# Patient Record
Sex: MALE | Race: BLACK OR AFRICAN AMERICAN | NOT HISPANIC OR LATINO | Employment: OTHER | URBAN - METROPOLITAN AREA
[De-identification: names, ages, dates, MRNs, and addresses within clinical notes are randomized per-mention and may not be internally consistent; named-entity substitution may affect disease eponyms.]

---

## 2021-11-23 ENCOUNTER — HOSPITAL ENCOUNTER (EMERGENCY)
Facility: HOSPITAL | Age: 76
Discharge: HOME | End: 2021-11-23
Attending: EMERGENCY MEDICINE
Payer: MEDICARE

## 2021-11-23 VITALS
WEIGHT: 199 LBS | DIASTOLIC BLOOD PRESSURE: 83 MMHG | OXYGEN SATURATION: 100 % | HEART RATE: 83 BPM | BODY MASS INDEX: 27.86 KG/M2 | HEIGHT: 71 IN | SYSTOLIC BLOOD PRESSURE: 162 MMHG | TEMPERATURE: 98 F | RESPIRATION RATE: 17 BRPM

## 2021-11-23 DIAGNOSIS — R05.9 COUGH: ICD-10-CM

## 2021-11-23 DIAGNOSIS — J32.9 VIRAL SINUSITIS: Primary | ICD-10-CM

## 2021-11-23 DIAGNOSIS — R09.82 POSTNASAL DRIP: ICD-10-CM

## 2021-11-23 DIAGNOSIS — B97.89 VIRAL SINUSITIS: Primary | ICD-10-CM

## 2021-11-23 PROCEDURE — 99284 EMERGENCY DEPT VISIT MOD MDM: CPT

## 2021-11-23 RX ORDER — DIPHENHYDRAMINE HCL 25 MG/1
1 TABLET, FILM COATED ORAL
Qty: 126 ML | Refills: 0 | Status: SHIPPED | OUTPATIENT
Start: 2021-11-23 | End: 2021-11-30

## 2021-11-23 RX ORDER — BENZONATATE 100 MG/1
100 CAPSULE ORAL
Status: DISCONTINUED | OUTPATIENT
Start: 2021-11-23 | End: 2021-11-23

## 2021-11-23 RX ORDER — CETIRIZINE HYDROCHLORIDE 10 MG/1
10 TABLET ORAL NIGHTLY
Qty: 30 TABLET | Refills: 0 | Status: SHIPPED | OUTPATIENT
Start: 2021-11-23 | End: 2023-10-09

## 2022-10-10 ENCOUNTER — HOSPITAL ENCOUNTER (EMERGENCY)
Facility: HOSPITAL | Age: 77
Discharge: HOME | End: 2022-10-10
Attending: EMERGENCY MEDICINE
Payer: MEDICARE

## 2022-10-10 VITALS
HEIGHT: 71 IN | SYSTOLIC BLOOD PRESSURE: 138 MMHG | RESPIRATION RATE: 17 BRPM | HEART RATE: 92 BPM | TEMPERATURE: 98 F | WEIGHT: 189 LBS | OXYGEN SATURATION: 98 % | BODY MASS INDEX: 26.46 KG/M2 | DIASTOLIC BLOOD PRESSURE: 68 MMHG

## 2022-10-10 DIAGNOSIS — R35.0 URINARY FREQUENCY: Primary | ICD-10-CM

## 2022-10-10 LAB
BILIRUB UR QL STRIP: NEGATIVE
CLARITY UR: CLEAR
COLOR UR: YELLOW
GLUCOSE UR QL STRIP: NEGATIVE
HGB UR QL STRIP: NEGATIVE
KETONES UR QL STRIP: NEGATIVE
LEUKOCYTE ESTERASE UR QL STRIP: ABNORMAL
MICROSCOPIC COMMENT: NORMAL
NITRITE UR QL STRIP: NEGATIVE
PH UR STRIP: 6 [PH] (ref 5–8)
POCT GLUCOSE: 150 MG/DL (ref 70–110)
PROT UR QL STRIP: NEGATIVE
RBC #/AREA URNS HPF: 2 /HPF (ref 0–4)
SP GR UR STRIP: 1.02 (ref 1–1.03)
SQUAMOUS #/AREA URNS HPF: 1 /HPF
URN SPEC COLLECT METH UR: ABNORMAL
UROBILINOGEN UR STRIP-ACNC: NEGATIVE EU/DL
WBC #/AREA URNS HPF: 2 /HPF (ref 0–5)

## 2022-10-10 PROCEDURE — 99282 EMERGENCY DEPT VISIT SF MDM: CPT | Mod: 25

## 2022-10-10 PROCEDURE — 82962 GLUCOSE BLOOD TEST: CPT

## 2022-10-10 PROCEDURE — 81000 URINALYSIS NONAUTO W/SCOPE: CPT

## 2022-10-10 NOTE — ED TRIAGE NOTES
Pt. Reports he has been having frequency in urination  for the past few months. Pt. Reports he has discuss his problem with his PCP however he cont to have the same symptoms. Pt. Denies taking any meds. Pt. Denies any pain with urination.

## 2022-10-10 NOTE — ED PROVIDER NOTES
Encounter Date: 10/10/2022    SCRIBE #1 NOTE: I, Winnie Randall, am scribing for, and in the presence of,  Joo Hensley PA-C. I have scribed the following portions of the note - Other sections scribed: HPI, ROS.     History     Chief Complaint   Patient presents with    Urinary Frequency     Pt c/o urinary frequency x 1 week. Pt states he has had intermittent urinary frequency over the years but this time his symptoms have increased. Pt denies burning or blood in urine, cp, sob, n/v/d.     CC: Urinary Frequency    HPI: This is a 77 y.o.male patient, with no pertinent  PMHx, presenting to the ED for further evaluation of chronic intermittent urinary frequency worsening in the past week. Patient reports associated malodorous urine. Patient denies seeing an urologist regarding his symptoms in the past.  Patient reports symptom onset for the past 10 years.  Patient denies taking any medications on a daily basis except sinus medications. Patient states he eats a lot of greens and drinks many fluids including cranberry juice, lemonade, water and milk. Patient denies any fever, chills, shortness of breath, chest pain, neck pain, back pain, abdominal pain, rash, headaches, congestion, rhinorrhea, cough, sore throat, nausea, vomiting, diarrhea, dysuria, hematuria, or any other associated symptoms. No prior Tx. No alleviating or aggravating factors. No known drug allergies.        The history is provided by the patient. No  was used.   Review of patient's allergies indicates:  No Known Allergies  History reviewed. No pertinent past medical history.  Past Surgical History:   Procedure Laterality Date    LEG SURGERY      knee repair     Family History   Problem Relation Age of Onset    No Known Problems Mother     No Known Problems Father     No Known Problems Sister     No Known Problems Brother     No Known Problems Maternal Aunt     No Known Problems Maternal Uncle     No Known Problems Paternal Aunt      No Known Problems Paternal Uncle     No Known Problems Maternal Grandmother     No Known Problems Maternal Grandfather     No Known Problems Paternal Grandmother     No Known Problems Paternal Grandfather     Amblyopia Neg Hx     Blindness Neg Hx     Cancer Neg Hx     Cataracts Neg Hx     Diabetes Neg Hx     Glaucoma Neg Hx     Hypertension Neg Hx     Macular degeneration Neg Hx     Retinal detachment Neg Hx     Strabismus Neg Hx     Stroke Neg Hx     Thyroid disease Neg Hx      Social History     Tobacco Use    Smoking status: Former    Smokeless tobacco: Never   Substance Use Topics    Alcohol use: Yes     Comment: occasional    Drug use: No     Review of Systems   Constitutional:  Negative for chills and fever.   HENT:  Negative for congestion, ear pain, rhinorrhea and sore throat.    Eyes:  Negative for redness.   Respiratory:  Negative for cough and shortness of breath.    Cardiovascular:  Negative for chest pain.   Gastrointestinal:  Negative for abdominal pain, diarrhea, nausea and vomiting.   Genitourinary:  Positive for frequency. Negative for decreased urine volume, difficulty urinating, dysuria, hematuria and urgency.        (+) malodorous urine   Musculoskeletal:  Negative for back pain and neck pain.   Skin:  Negative for rash.   Neurological:  Negative for headaches.   Psychiatric/Behavioral:  Negative for confusion.      Physical Exam     Initial Vitals [10/10/22 1052]   BP Pulse Resp Temp SpO2   137/64 95 17 97.9 °F (36.6 °C) 97 %      MAP       --         Physical Exam    Nursing note and vitals reviewed.  Constitutional: Vital signs are normal. He appears well-developed and well-nourished. He is active.  Non-toxic appearance. No distress.   HENT:   Head: Normocephalic and atraumatic.   Mouth/Throat: Mucous membranes are normal.   Eyes: EOM are normal.   Neck: Trachea normal. Neck supple.   Normal range of motion.   Full passive range of motion without pain.     Cardiovascular:  Normal rate and  regular rhythm.           Pulses:       Radial pulses are 2+ on the right side and 2+ on the left side.   Pulmonary/Chest: Breath sounds normal. No respiratory distress.   Abdominal: Abdomen is soft. Bowel sounds are normal. He exhibits no distension. There is no abdominal tenderness.   No right CVA tenderness.  No left CVA tenderness. There is no rebound and no guarding.   Musculoskeletal:         General: No edema. Normal range of motion.      Cervical back: Full passive range of motion without pain, normal range of motion and neck supple. No rigidity.     Neurological: He is alert.   Skin: Skin is warm, dry and intact.   Psychiatric: He has a normal mood and affect.       ED Course   Procedures  Labs Reviewed   URINALYSIS, REFLEX TO URINE CULTURE - Abnormal; Notable for the following components:       Result Value    Leukocytes, UA 2+ (*)     All other components within normal limits    Narrative:     Specimen Source->Urine   POCT GLUCOSE - Abnormal; Notable for the following components:    POCT Glucose 150 (*)     All other components within normal limits   URINALYSIS MICROSCOPIC    Narrative:     Specimen Source->Urine   POCT GLUCOSE MONITORING CONTINUOUS          Imaging Results    None          Medications - No data to display  Medical Decision Making:   History:   Old Medical Records: I decided to obtain old medical records.  Clinical Tests:   Lab Tests: Ordered and Reviewed  ED Management:  This is a 77 y.o.male patient, with no pertinent  PMHx, presenting to the ED for further evaluation of chronic intermittent urinary frequency worsening in the past week. On physical exam, patient is well-appearing and in no acute distress.  Nontoxic appearing.  Lungs are clear to auscultation bilaterally.  Abdomen is soft and nontender.  No guarding, rigidity, rebound.  2+ radial pulses bilaterally.  No CVA tenderness bilaterally.  UA unremarkable.  Doubt cystitis.  Point of care glucose 150.  Ambulatory referral to  Urology ordered.  Urged prompt follow-up with urology and PCP for further evaluation.    Strict return precautions given. I discussed with the patient/family the diagnosis, treatment plan, indications for return to the emergency department, and for expected follow-up. The patient/family verbalized an understanding. The patient/family is asked if there are any questions or concerns. We discuss the case, until all issues are addressed to the patient/family's satisfaction. Patient/family understands and is agreeable to the plan. Patient is stable and ready for discharge.            Scribe Attestation:   Scribe #1: I performed the above scribed service and the documentation accurately describes the services I performed. I attest to the accuracy of the note.                   Clinical Impression:   Final diagnoses:  [R35.0] Urinary frequency (Primary)      ED Disposition Condition    Discharge Stable          ED Prescriptions    None       Follow-up Information       Follow up With Specialties Details Why Contact Info    [facility]  Schedule an appointment as soon as possible for a visit in 2 days for further evaluation [address] [place] [address]  [phone]      [facility] Emergency Dept Emergency Medicine In 2 days If symptoms worsen [address] [facility] [id number] [phone]          I, Joo Hensley, personally performed the services described in this documentation. All medical record entries made by the aramisibe were at my direction and in my presence. I have reviewed the chart and agree that the record reflects my personal performance and is accurate and complete.       Joo Hensley PA-C  10/10/22 1223       Joo Hensley PA-C  10/10/22 1503

## 2022-10-10 NOTE — DISCHARGE INSTRUCTIONS

## 2023-11-10 PROBLEM — S51.819A FOREARM LACERATION: Status: ACTIVE | Noted: 2023-11-10

## 2024-06-15 PROBLEM — M19.90 ARTHRITIS: Status: ACTIVE | Noted: 2023-04-06

## 2024-06-15 PROBLEM — N40.1 BENIGN PROSTATIC HYPERPLASIA WITH URINARY HESITANCY: Status: ACTIVE | Noted: 2022-11-21

## 2024-06-15 PROBLEM — I10 HYPERTENSIVE DISORDER: Status: ACTIVE | Noted: 2023-04-06

## 2024-06-15 PROBLEM — R39.11 BENIGN PROSTATIC HYPERPLASIA WITH URINARY HESITANCY: Status: ACTIVE | Noted: 2022-11-21

## 2024-06-15 PROBLEM — H11.052 PROGRESSIVE PERIPHERAL PTERYGIUM OF LEFT EYE: Status: ACTIVE | Noted: 2023-01-13

## 2024-06-16 ENCOUNTER — HOSPITAL ENCOUNTER (INPATIENT)
Facility: HOSPITAL | Age: 79
LOS: 4 days | Discharge: HOME HEALTH | DRG: 149 | End: 2024-06-20
Attending: FAMILY MEDICINE | Admitting: FAMILY MEDICINE
Payer: MEDICARE

## 2024-06-16 DIAGNOSIS — R42 VERTIGO: ICD-10-CM

## 2024-06-16 DIAGNOSIS — I63.9 CEREBROVASCULAR ACCIDENT (CVA), UNSPECIFIED MECHANISM: Primary | ICD-10-CM

## 2024-06-16 DIAGNOSIS — R29.90 STROKE-LIKE SYMPTOMS: ICD-10-CM

## 2024-06-16 DIAGNOSIS — I63.9 CVA (CEREBRAL VASCULAR ACCIDENT): ICD-10-CM

## 2024-06-16 DIAGNOSIS — I63.9 STROKE: ICD-10-CM

## 2024-06-16 PROCEDURE — 99223 1ST HOSP IP/OBS HIGH 75: CPT | Mod: ,,, | Performed by: PSYCHIATRY & NEUROLOGY

## 2024-06-16 PROCEDURE — 25000003 PHARM REV CODE 250: Performed by: REGISTERED NURSE

## 2024-06-16 PROCEDURE — 11000001 HC ACUTE MED/SURG PRIVATE ROOM

## 2024-06-16 PROCEDURE — 97162 PT EVAL MOD COMPLEX 30 MIN: CPT

## 2024-06-16 PROCEDURE — 94761 N-INVAS EAR/PLS OXIMETRY MLT: CPT

## 2024-06-16 PROCEDURE — 99900035 HC TECH TIME PER 15 MIN (STAT)

## 2024-06-16 PROCEDURE — 92523 SPEECH SOUND LANG COMPREHEN: CPT

## 2024-06-16 PROCEDURE — 92610 EVALUATE SWALLOWING FUNCTION: CPT

## 2024-06-16 PROCEDURE — 97530 THERAPEUTIC ACTIVITIES: CPT

## 2024-06-16 PROCEDURE — 97165 OT EVAL LOW COMPLEX 30 MIN: CPT

## 2024-06-16 RX ORDER — ATORVASTATIN CALCIUM 40 MG/1
40 TABLET, FILM COATED ORAL DAILY
Status: DISCONTINUED | OUTPATIENT
Start: 2024-06-16 | End: 2024-06-17

## 2024-06-16 RX ORDER — ASPIRIN 81 MG/1
81 TABLET ORAL DAILY
Status: DISCONTINUED | OUTPATIENT
Start: 2024-06-16 | End: 2024-06-21 | Stop reason: HOSPADM

## 2024-06-16 RX ORDER — LOSARTAN POTASSIUM 25 MG/1
25 TABLET ORAL DAILY
Status: DISCONTINUED | OUTPATIENT
Start: 2024-06-16 | End: 2024-06-21 | Stop reason: HOSPADM

## 2024-06-16 RX ORDER — TAMSULOSIN HYDROCHLORIDE 0.4 MG/1
0.4 CAPSULE ORAL DAILY
Status: DISCONTINUED | OUTPATIENT
Start: 2024-06-16 | End: 2024-06-21 | Stop reason: HOSPADM

## 2024-06-16 RX ORDER — SODIUM CHLORIDE 0.9 % (FLUSH) 0.9 %
10 SYRINGE (ML) INJECTION
Status: DISCONTINUED | OUTPATIENT
Start: 2024-06-16 | End: 2024-06-21 | Stop reason: HOSPADM

## 2024-06-16 RX ORDER — BISACODYL 10 MG/1
10 SUPPOSITORY RECTAL DAILY PRN
Status: DISCONTINUED | OUTPATIENT
Start: 2024-06-16 | End: 2024-06-21 | Stop reason: HOSPADM

## 2024-06-16 RX ORDER — ONDANSETRON HYDROCHLORIDE 2 MG/ML
4 INJECTION, SOLUTION INTRAVENOUS EVERY 12 HOURS PRN
Status: DISCONTINUED | OUTPATIENT
Start: 2024-06-16 | End: 2024-06-21 | Stop reason: HOSPADM

## 2024-06-16 RX ADMIN — LOSARTAN POTASSIUM 25 MG: 25 TABLET, FILM COATED ORAL at 09:06

## 2024-06-16 RX ADMIN — ATORVASTATIN CALCIUM 40 MG: 40 TABLET, FILM COATED ORAL at 09:06

## 2024-06-16 RX ADMIN — TAMSULOSIN HYDROCHLORIDE 0.4 MG: 0.4 CAPSULE ORAL at 09:06

## 2024-06-16 RX ADMIN — ASPIRIN 81 MG: 81 TABLET, COATED ORAL at 09:06

## 2024-06-16 NOTE — ASSESSMENT & PLAN NOTE
Patient presented with dizziness outside of window for thrombolytic  CT head with old infarcts which were unknown to patient  CTA head and neck without LVO or stenosis    MRI brain in a.m.   echo ordered   consult neurology  Start aspirin and statin

## 2024-06-16 NOTE — HPI
"Jack Soria is a 80yo M with a PMH of HTN, OA, and BPH who presented to the [facility] ED on 6/15/2024 with complaints of dizziness. Per referring NP: "woke two days ago with sinus and ear pressure. Upon noticing symptoms he began "shaking my head a lot" and since then has dizziness which is positional, worse from reclined position to siting or standing. He has several days where" I am getting a quart of mucus out my head a day", cough and worsening dizziness. Dizziness symptoms began approximately 3 days ago, however could be as a 4 out as a week. He has no previous history of CVA."     In the ED, vitals were stable: /71 though otherwise unremarkable. Labs grossly unrevealing. 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 Telestroke consult was placed; pt out of the tPN window. Dr. Estrada recommended transfer for MRI and neurology evaluation.      "

## 2024-06-16 NOTE — PT/OT/SLP EVAL
"Physical Therapy Evaluation    Patient Name:  Jack Soria   MRN:  [id number]    Recommendations:     Discharge Recommendations: Low Intensity Therapy   Discharge Equipment Recommendations: walker, rolling   Barriers to discharge: Decreased caregiver support    Assessment:     Jack Soria is a 79 y.o. male admitted with a medical diagnosis of <principal problem not specified>.  He presents with the following impairments/functional limitations: weakness, impaired endurance, impaired functional mobility, gait instability, impaired balance, decreased lower extremity function, decreased safety awareness .    Physical therapy evaluation completed.  Pt is a poor historian.  He lives alone in mobile home, cousin lives next door who is able to assist.  He was independent without use of any assistive device prior to admission.  He does report history of CVA in the past. Pt reports dizziness started a few days ago after he shook his head to clear mucus.  BP assessed for orthostatics.  BP in supine 127/69.  Pt able to transfer to EOB mod independent.  BP in sitting 127/77, asymptomatic.  Pt stood at EOB without AD with contact guard.  Pt ambulated 20 feet in room without AD with contact guard. BP in standing 143/75.  Pt given rolling walker due to unsteady gait, he ambulated 10 feet with rolling walker with CG/SBA with cuing for safe use of device. Pt able to perform active neck range of motion and bend over to doff and don socks without onset of dizziness.  Pt has history of right TKA, pt reports knee cap "got stuck down" after surgery has he has had to be careful walking since.  Pt has fair right quad contraction but significant extension lag (approx. 40 deg) due to weakness. Non skid socks and gait belt used during session.    Rehab Prognosis: Good; patient would benefit from acute skilled PT services to address these deficits and reach maximum level of function.    Recent Surgery: * No surgery found *      Plan: "     During this hospitalization, patient to be seen 4 x/week to address the identified rehab impairments via gait training, therapeutic activities, therapeutic exercises, neuromuscular re-education and progress toward the following goals:    Plan of Care Expires:  07/07/24    Subjective     Chief Complaint: dizziness  Patient/Family Comments/goals: Return home  Pain/Comfort:  Pain Rating 1: 0/10    Patients cultural, spiritual, Denominational conflicts given the current situation: no    Living Environment:  Pt lives alone in mobile home, 4 RAZIA with right HR.  Tub/shower combo.  Prior to admission, patients level of function was mod I without assistive device.  Equipment used at home: none.  DME owned (not currently used): none.  Upon discharge, patient will have assistance from neighbor/cousin.    Objective:     Communicated with nurse prior to session.  Patient found HOB elevated with bed alarm, telemetry  upon PT entry to room.    General Precautions: Standard, fall  Orthopedic Precautions:  none  Braces:  none  Respiratory Status: Room air    Exams:  Cognitive Exam:  Patient is oriented to Person, Place, Time, and Situation  Gross Motor Coordination:  WFL  Postural Exam:  Patient presented with the following abnormalities:    -       elevated shoulders bilaterally, mildly forward head  Sensation:    -       Intact  RLE ROM: WFL  RLE Strength: WFL except right knee extension 3-/5  LLE ROM: WFL  LLE Strength: grossly 4/5 t/o    Functional Mobility:  Bed Mobility:     Rolling Left:  modified independence  Scooting: modified independence  Supine to Sit: modified independence  Sit to Supine: modified independence  Transfers:     Sit to Stand:  contact guard assistance and of 1 persons with no AD  Gait: 20 feet without AD with contact guard assist, 10 feet with rolling walker with CG/SBA with cuing for use of device      AM-PAC 6 CLICK MOBILITY  Total Score:20       Treatment & Education:   PT educated patient:  PT plan  of care/role of PT  Safety with OOB mobility  Use of RW for household and community ambulation.   Energy conservation techniques   Discharge disposition    Pt  verbalized understanding    Pt performed bed mobility mod I, transfers and ambulation with CG.  Recommend use of rolling walker for safety.    Pt performed reciprocal marching in standing x 10 with CG and heel raises     Patient left HOB elevated with bed alarm on.    GOALS:   Multidisciplinary Problems       Physical Therapy Goals          Problem: Physical Therapy    Goal Priority Disciplines Outcome Goal Variances Interventions   Physical Therapy Goal     PT, PT/OT Progressing     Description: Goals to be met by: 2024     Patient will increase functional independence with mobility by performin. Supine to sit with Modified [place]  2. Sit to supine with Modified [place]  4. Rolling with Modified [place].  5. Sit to stand transfer Mod I using Rolling Walker  6. Bed to chair transfer with Mod I Rolling Walker  7. Gait  2 x 150 feet Mod I using Rolling Walker.   8. Ascend/descend 4 stair with bilateral Handrails Stand-by Assistance   9. Lower extremity exercise program  2 x10 reps with supervision                          History:     Past Medical History:   Diagnosis Date    Arthritis 2023    Essential (primary) hypertension     Hypertensive disorder 2023       Past Surgical History:   Procedure Laterality Date    CIRCUMCISION      LEG SURGERY      knee repair       Time Tracking:     PT Received On: 24  PT Start Time: 1013     PT Stop Time: 1039  PT Total Time (min): 26 min     Billable Minutes: Evaluation 14 and Therapeutic Activity 12      2024

## 2024-06-16 NOTE — PT/OT/SLP EVAL
"Speech Language Pathology Evaluation  Cognitive/Bedside Swallow    Patient Name:  Jack Soria   MRN:  [id number]  Admitting Diagnosis: <principal problem not specified>    Recommendations:                  General Recommendations:  Follow-up not indicated  Diet recommendations:  Regular Diet - IDDSI Level 7, Thin liquids - IDDSI Level 0   Aspiration Precautions: HOB to 90 degrees and Remain upright 30 minutes post meal   General Precautions: Standard, fall  Communication strategies:  none    Assessment:     Jack Soria is a 79 y.o. male with an admit diagnosis of  CVA/TIA .  He presents with oropharyngeal skills and speech/cog la skills WFL this assessment. Recommend continue current PO diet of regular/thin. Whole pills OK. Follow up not indicated. Clinician notified patient of these recommendations and pt acknowledged understanding. ST notified Dr Randall of recommendations via Secure Chat. No response as of yet.    History:     Past Medical History:   Diagnosis Date    Arthritis 4/6/2023    Essential (primary) hypertension     Hypertensive disorder 4/6/2023       Past Surgical History:   Procedure Laterality Date    CIRCUMCISION      LEG SURGERY      knee repair       Social History: Patient lives by himself.    Prior Intubation HX:  Not indicated this admit    Modified Barium Swallow: Not indicated this admit.     Chest X-Rays: Not indicated this admit    Prior diet: regular/thin per pt.    Occupation/hobbies/homemaking: pt is retired .    Subjective     HPI: Jack Soria is a 78yo M with a PMH of HTN, OA, and BPH who presented to the [facility] ED on 6/15/2024 with complaints of dizziness. Per referring NP: "woke two days ago with sinus and ear pressure. Upon noticing symptoms he began "shaking my head a lot" and since then has dizziness which is positional, worse from reclined position to siting or standing. He has several days where" I am getting a quart of mucus out my head a day", cough " "and worsening dizziness. Dizziness symptoms began approximately 3 days ago, however could be as a 4 out as a week. He has no previous history of CVA."     In the ED, vitals were stable: /71 though otherwise unremarkable. Labs grossly unrevealing. 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 Telestroke consult was placed; pt out of the tPN window. Dr. Estrada recommended transfer for MRI and neurology evaluation.        Patient goals: "To get better and get back home."    Pain/Comfort:  Pain Rating 1: 0/10    Respiratory Status: Room air    Objective:     Cognitive Status:    Orientation Oriented x4  Memory Immediate Recall WFL, long term recall WFL, and recall general information wfl       Receptive Language:   Comprehension:      Questions Open ended questions WFL  Commands  two step basic commands WFL    Pragmatics:    Initiation WFL    Expressive Language:  Verbal:    Initiation WFl  Naming Confrontation WFL  Conversational speech WFL        Motor Speech:  WFL    Voice:   [facility]    Oral Musculature Evaluation  Oral Musculature: WFL  Dentition: scattered dentition  Secretion Management: adequate  Mucosal Quality: good  Mandibular Strength and Mobility: WFL  Oral Labial Strength and Mobility: WFL  Lingual Strength and Mobility: WFL  Velar Elevation: [facility]  Buccal Strength and Mobility: WFL  Volitional Cough: [facility]  Volitional Swallow: [facility]  Voice Prior to PO Intake: [facility]    Bedside Swallow Eval:   Consistencies Assessed:  Thin liquids approx 3 oz H2O via self regulated straw sips  Soft solids approx 1 oz diced pears via self regulated tsp bites  Solids 4 bites chicken breast, 2 bites rice, self regulated      Oral Phase:   WFL    Pharyngeal Phase:   no overt clinical signs/symptoms of aspiration  no overt clinical signs/symptoms of pharyngeal dysphagia    Compensatory " Strategies  None    Treatment: Clinician provided bedside swallow eval and speech/cog la assessment after confirming with JASON Barber.     Goals:   Multidisciplinary Problems       SLP Goals          Problem: SLP    Goal Priority Disciplines Outcome   SLP Goal     SLP Progressing   Description: 1. Patient will participate in clinical swallow eval in order to aid in determining safest and most efficient means of nutrition, hydration, and medications. --GOAL MET 6/16/24  2. Pt will participate in speech/cog la eval.--GOAL MET 6/16/24                       Plan:     Patient to be seen:      Plan of Care expires:     Plan of Care reviewed with:  patient   SLP Follow-Up:  No       Discharge recommendations:  Therapy Intensity Recommendations at Discharge: No Therapy Indicated   Barriers to Discharge:  None    Time Tracking:     SLP Treatment Date:   06/16/24  Speech Start Time:  1100  Speech Stop Time:  1124     Speech Total Time (min):  24 min    Billable Minutes: Eval 16  and Eval Swallow and Oral Function 8    06/16/2024

## 2024-06-16 NOTE — SUBJECTIVE & OBJECTIVE
Past Medical History:   Diagnosis Date    Arthritis 2023    Essential (primary) hypertension     Hypertensive disorder 2023       Past Surgical History:   Procedure Laterality Date    CIRCUMCISION      LEG SURGERY      knee repair       Review of patient's allergies indicates:  No Known Allergies    Current Facility-Administered Medications on File Prior to Encounter   Medication    [COMPLETED] aspirin tablet 325 mg    [COMPLETED] clopidogreL tablet 300 mg    [COMPLETED] iohexoL (OMNIPAQUE 350) injection 70 mL    [COMPLETED] meclizine tablet 25 mg    [COMPLETED] ondansetron injection 4 mg    [DISCONTINUED] clopidogreL tablet 300 mg     Current Outpatient Medications on File Prior to Encounter   Medication Sig    losartan (COZAAR) 25 MG tablet Take 1 tablet (25 mg total) by mouth once daily.    meloxicam (MOBIC) 7.5 MG tablet Take 7.5 mg by mouth once daily.    tamsulosin (FLOMAX) 0.4 mg Cap TAKE 1 CAPSULE BY MOUTH DAILY AT BEDTIME     Family History       Problem Relation (Age of Onset)    Alcohol abuse Father    Emphysema Father    No Known Problems Mother, Sister, Brother, Maternal Grandmother, Maternal Grandfather, Paternal Grandmother, Paternal Grandfather, Maternal Aunt, Maternal Uncle, Paternal Aunt, Paternal Uncle          Tobacco Use    Smoking status: Former     Current packs/day: 0.00     Types: Cigarettes     Start date: 1963     Quit date: 2007     Years since quittin.4    Smokeless tobacco: Never   Substance and Sexual Activity    Alcohol use: Not Currently    Drug use: No    Sexual activity: Not Currently     Review of Systems   Musculoskeletal:  Positive for neck pain (right side, at right sternocleidomastoid) and neck stiffness.   All other systems reviewed and are negative.    Objective:     Vital Signs (Most Recent):  Temp: 97.7 °F (36.5 °C) (24)  Pulse: 60 (24)  Resp: 18 (24)  BP: (!) 148/73 (24)  SpO2: 98 % (24) Vital  Signs (24h Range):  Temp:  [97.7 °F (36.5 °C)-98.2 °F (36.8 °C)] 97.7 °F (36.5 °C)  Pulse:  [55-79] 60  Resp:  [16-20] 18  SpO2:  [94 %-100 %] 98 %  BP: (132-149)/(60-73) 148/73     Weight: 76.6 kg (168 lb 14 oz)  Body mass index is 23.55 kg/m².     Physical Exam  Vitals reviewed.   Constitutional:       Appearance: Normal appearance. He is normal weight.   HENT:      Head: Normocephalic.      Mouth/Throat:      Mouth: Mucous membranes are dry.      Pharynx: Oropharynx is clear.   Eyes:      Pupils: Pupils are equal, round, and reactive to light.   Cardiovascular:      Rate and Rhythm: Normal rate and regular rhythm.      Pulses: Normal pulses.      Heart sounds: Normal heart sounds.   Pulmonary:      Effort: Pulmonary effort is normal.      Breath sounds: Normal breath sounds.   Abdominal:      General: Bowel sounds are normal.      Palpations: Abdomen is soft.   Musculoskeletal:         General: Normal range of motion.      Cervical back: Tenderness (Right-sided neck) present.   Skin:     General: Skin is warm.      Capillary Refill: Capillary refill takes less than 2 seconds.   Neurological:      General: No focal deficit present.      Mental Status: He is alert and oriented to person, place, and time. Mental status is at baseline.   Psychiatric:         Mood and Affect: Mood normal.              CRANIAL NERVES     CN III, IV, VI   Pupils are equal, round, and reactive to light.       Significant Labs: All pertinent labs within the past 24 hours have been reviewed.  Recent Lab Results         06/15/24  1647   06/15/24  1528   06/15/24  1508        Albumin     4.2       ALP     62       ALT     19       Anion Gap     10       AST     23       Baso #     0.02       Basophil %     0.4       BILIRUBIN TOTAL     0.7  Comment: For infants and newborns, interpretation of results should be based  on gestational age, weight and in agreement with clinical  observations.    Premature Infant recommended reference ranges:  Up  to 24 hours.............<8.0 mg/dL  Up to 48 hours............<12.0 mg/dL  3-5 days..................<15.0 mg/dL  6-29 days.................<15.0 mg/dL         BUN     17       Calcium     10.1       Chloride     104       Cholesterol Total     146  Comment: The National Cholesterol Education Program (NCEP) has set the  following guidelines (reference ranges) for Cholesterol:  Optimal.....................<200 mg/dL  Borderline High.............200-239 mg/dL  High........................> or = 240 mg/dL         CO2     25       Creatinine     1.0       Differential Method     Automated       eGFR     >60.0       Eos #     0.0       Eos %     0.7       Glucose     110       Gran # (ANC)     3.3       Gran %     60.1       HDL     50  Comment: The National Cholesterol Education Program (NCEP) has set the  following guidelines (reference values) for HDL Cholesterol:  Low...............<40 mg/dL  Optimal...........>60 mg/dL         HDL/Cholesterol Ratio     34.2       Hematocrit     41.7       Hemoglobin     13.5       Immature Grans (Abs)     0.01  Comment: Mild elevation in immature granulocytes is non specific and   can be seen in a variety of conditions including stress response,   acute inflammation, trauma and pregnancy. Correlation with other   laboratory and clinical findings is essential.         Immature Granulocytes     0.2       INR     1.1  Comment: Coumadin Therapy:  2.0 - 3.0 for INR for all indicators except mechanical heart valves  and antiphospholipid syndromes which should use 2.5 - 3.5.  LOT^040^PT Inn^[id number]         LDL Cholesterol     84.8  Comment: The National Cholesterol Education Program (NCEP) has set the  following guidelines (reference values) for LDL Cholesterol:  Optimal.......................<130 mg/dL  Borderline High...............130-159 mg/dL  High..........................160-189 mg/dL  Very High.....................>190 mg/dL         Lymph #     1.7       Lymph %     30.2       MCH      27.1       MCHC     32.4       MCV     84       Mono #     0.5       Mono %     8.4       MPV     10.1       Non-HDL Cholesterol     96  Comment: Risk category and Non-HDL cholesterol goals:  Coronary heart disease (CHD)or equivalent (10-year risk of CHD >20%):  Non-HDL cholesterol goal     <130 mg/dL  Two or more CHD risk factors and 10-year risk of CHD <= 20%:  Non-HDL cholesterol goal     <160 mg/dL  0 to 1 CHD risk factor:  Non-HDL cholesterol goal     <190 mg/dL         nRBC     0       Platelet Count     219       POCT Glucose   113         Potassium     4.2  Comment: Specimen slightly hemolyzed       PROTEIN TOTAL     7.6       PT     11.4        Acceptable Yes           RBC     4.98       RDW     14.2       SARS-CoV-2 RNA, Amplification, Qual Negative           Sodium     139       Total Cholesterol/HDL Ratio     2.9       Triglycerides     56  Comment: The National Cholesterol Education Program (NCEP) has set the  following guidelines (reference values) for triglycerides:  Normal......................<150 mg/dL  Borderline High.............150-199 mg/dL  High........................200-499 mg/dL         TSH     2.136       WBC     5.47               Significant Imaging: I have reviewed all pertinent imaging results/findings within the past 24 hours.  I have reviewed and interpreted all pertinent imaging results/findings within the past 24 hours.

## 2024-06-16 NOTE — PLAN OF CARE
ST provided clinical swallow eval and speech/cog la assessment this AM. Oropharyngeal skills and speech/cog la skills found to be WFL at this time. Recommend regular consistency solids and liquids. Whole pills OK. Further ST intervention not warranted at this time.

## 2024-06-16 NOTE — PT/OT/SLP EVAL
Occupational Therapy   Evaluation    Name: Jack Soria  MRN: [id number]  Admitting Diagnosis: <principal problem not specified>  Recent Surgery: * No surgery found *      Recommendations:     Discharge Recommendations: Low Intensity Therapy  Discharge Equipment Recommendations:  walker, rolling  Barriers to discharge:  Decreased caregiver support, Inaccessible home environment    Assessment:     Jack Soria is a 79 y.o. male with a medical diagnosis of <principal problem not specified>.  He presents with the following performance deficits affecting function: weakness, impaired endurance, impaired self care skills, gait instability, impaired balance, decreased lower extremity function.      Pt was agreeable to and participated in OT/PT evaluation.  Pt lives alone and reports that he was independent with mobility and ADLS at [facility].  Pt completed his evaluation and noted to require CGA - SBA for mobility and set up - CGA for ADLs.  Goals established to assist pt with returning to [facility] regarding ADLs and func mobility.  Pt will benefit from skilled OT services in order to increase his level of safety and independence with ADLs and mobility.      Rehab Prognosis: Good; patient would benefit from acute skilled OT services to address these deficits and reach maximum level of function.       Plan:     Patient to be seen 3 x/week to address the above listed problems via self-care/home management, therapeutic activities, therapeutic exercises  Plan of Care Expires: 07/16/24  Plan of Care Reviewed with: patient    Subjective     Chief Complaint: dizzy   Patient/Family Comments/goals: return home    Occupational Profile:  Living Environment: pt lives alone in a mobile home with 4 RAZIA to enter and R HR - t/s combo for bathing  Previous level of function: independent with mobility and ADLS  Equipment Used at Home: none  Assistance upon Discharge: neighbors can assist    Pain/Comfort:  Pain Rating 1: 0/10  Pain Rating  Post-Intervention 1: 0/10    Patients cultural, spiritual, Moravian conflicts given the current situation: no    Objective:     Communicated with: nurse prior to session.  Patient found HOB elevated with bed alarm, telemetry upon OT entry to room.    General Precautions: Standard, fall  Orthopedic Precautions: N/A  Braces: N/A  Respiratory Status: Room air    Occupational Performance:    Bed Mobility:    Patient completed Scooting/Bridging with modified independence  Patient completed Supine to Sit with modified independence  Patient completed Sit to Supine with modified independence    Functional Mobility/Transfers:  Patient completed Sit <> Stand Transfer with contact guard assistance  with  no assistive device   Functional Mobility: walked with and without RW in room - CGA needed with both for safety    Activities of Daily Living:  Feeding:  modified independence    Grooming: modified independence    Lower Body Dressing: minimum assistance      Cognitive/Visual Perceptual:  Cognitive/Psychosocial Skills:     -       Oriented to: Person, Place, Time, and Situation   -       Follows Commands/attention:Easily distracted and Follows one-step commands  -       Communication: clear/fluent  -       Memory: No Deficits noted  -       Safety awareness/insight to disability: impaired   -       Mood/Affect/Coping skills/emotional control: Appropriate to situation    Physical Exam:  Balance: -       sit = good; stand = CGA with RW  Postural examination/scapula alignment:    -       Rounded shoulders  Skin integrity: Visible skin intact  Upper Extremity Range of Motion:   R shoulder flexion limited, but functional for ADLs - all other AROM bilaterally = WFL  Upper Extremity Strength:  BUES = WFL for ADLS   Strength:  BUEs = WFL for ADLS    AMPAC 6 Click ADL:  AMPAC Total Score: 21    Treatment & Education:  Pt completed ADLs and func mobility activities for tx session as noted above  Pt educated on role of OT and  POC      Patient left HOB elevated with call button in reach and bed alarm on    GOALS:   Multidisciplinary Problems       Occupational Therapy Goals          Problem: Occupational Therapy    Goal Priority Disciplines Outcome Interventions   Occupational Therapy Goal     OT, PT/OT Progressing    Description: Goals to be met by: 07/16/24     Patient will increase functional independence with ADLs by performing:    UE Dressing with Modified [place].  LE Dressing with Modified [place].  Grooming while standing at sink with Modified [place].  Toileting from toilet with Modified [place] for hygiene and clothing management.   Toilet transfer to toilet with Modified [place].                         History:     Past Medical History:   Diagnosis Date    Arthritis 4/6/2023    Essential (primary) hypertension     Hypertensive disorder 4/6/2023         Past Surgical History:   Procedure Laterality Date    CIRCUMCISION      LEG SURGERY      knee repair       Time Tracking:     OT Date of Treatment: 06/16/24  OT Start Time: 1013  OT Stop Time: 1039  OT Total Time (min): 26 min - coeval with PT    Billable Minutes:Evaluation 15  Therapeutic Activity 11    6/16/2024

## 2024-06-16 NOTE — PLAN OF CARE
Problem: Adult Inpatient Plan of Care  Goal: Plan of Care Review  Outcome: Progressing     VIRTUAL NURSE:  Cued into patient's room.  Lights off; unable to view room.  Introduced as VN for night shift that will be working with floor nurse and nursing assistant.  Permission received per patient to review admit assessment questions.  Educated patient on VN's role in patient care and VIP model.  Plan of care reviewed with patient.  Education per flowsheet.   Informed patient that staff will round on them every 2 hours but to use call light for any other needs they may have; informed of fall risk and fall precautions.  Patient verbalized understanding.  Opportunity given for questions and questions answered.  Patient denies complaints or any needs at this time. Instructed to call for assistance.  Will cont to monitor and intervene as needed.     Labs, notes, orders, and careplan initiated.        06/16/24 0140   Patient Request   Patient Requested no complaints or needs currently   Admission   Initial VN Admission Questions Complete   Communication Issues? Technical Issue   Shift   Virtual Nurse - Rounding Complete   Pain Management Interventions pain management plan reviewed with patient/caregiver   Virtual Nurse - Patient Verbalized Approval Of Camera Use;VN Rounding   Type of Frequent Check   Type Patient Rounds   Pain/Comfort/Sleep   Preferred Pain Scale number (Numeric Rating Pain Scale)   Comfort/Acceptable Pain Level 0   Pain Rating (0-10): Rest 0   Sleep/Rest/Relaxation no problem identified;awake

## 2024-06-16 NOTE — PLAN OF CARE
Problem: Occupational Therapy  Goal: Occupational Therapy Goal  Description: Goals to be met by: 07/16/24     Patient will increase functional independence with ADLs by performing:    UE Dressing with Modified [place].  LE Dressing with Modified [place].  Grooming while standing at sink with Modified [place].  Toileting from toilet with Modified [place] for hygiene and clothing management.   Toilet transfer to toilet with Modified [place].    Outcome: Progressing     Pt was agreeable to and participated in OT/PT evaluation.  Pt lives alone and reports that he was independent with mobility and ADLS at [facility].  Pt completed his evaluation and noted to require CGA - SBA for mobility and set up - CGA for ADLs.  Goals established to assist pt with returning to [facility] regarding ADLs and func mobility.  Pt will benefit from skilled OT services in order to increase his level of safety and independence with ADLs and mobility.      Beata Walls, OT  6/16/2024

## 2024-06-16 NOTE — CARE UPDATE
He was seen at the bedside.  No distress noted.  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   CTA head and neck-Suboptimal evaluation due to poor contrast timing with image acquisition.     Cervical carotid and vertebral circulation--no measurable stenosis.     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  No large vessel occlusion remaining proximal intracranial arterial circulation.  Mild multifocal stenosis anterior cerebral artery and posterior cerebral artery branches.  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    --neuro eval pending  --TTE pending  --MRI brain pending    Michael Smith NP

## 2024-06-16 NOTE — PLAN OF CARE
Problem: Physical Therapy  Goal: Physical Therapy Goal  Description: Goals to be met by: 2024     Patient will increase functional independence with mobility by performin. Supine to sit with Modified [place]  2. Sit to supine with Modified [place]  4. Rolling with Modified [place].  5. Sit to stand transfer Mod I using Rolling Walker  6. Bed to chair transfer with Mod I Rolling Walker  7. Gait  2 x 150 feet Mod I using Rolling Walker.   8. Ascend/descend 4 stair with bilateral Handrails Stand-by Assistance   9. Lower extremity exercise program  2 x10 reps with supervision     Outcome: Progressing

## 2024-06-16 NOTE — H&P
"  [facility] Medicine  History & Physical    Patient Name: Jack Soria  MRN: [id number]  Patient Class: IP- Inpatient  Admission Date: 6/16/2024  Attending Physician: Kelley France   Primary Care Provider: No primary care provider on file.         Patient information was obtained from patient and ER records.     Subjective:     Principal Problem:<principal problem not specified>    Chief Complaint: No chief complaint on file.       HPI: Jack Soria is a 78yo M with a PMH of HTN, OA, and BPH who presented to the [facility] ED on 6/15/2024 with complaints of dizziness. Per referring NP: "woke two days ago with sinus and ear pressure. Upon noticing symptoms he began "shaking my head a lot" and since then has dizziness which is positional, worse from reclined position to siting or standing. He has several days where" I am getting a quart of mucus out my head a day", cough and worsening dizziness. Dizziness symptoms began approximately 3 days ago, however could be as a 4 out as a week. He has no previous history of CVA."     In the ED, vitals were stable: /71 though otherwise unremarkable. Labs grossly unrevealing. 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 Telestroke consult was placed; pt out of the tPN window. Dr. Estrada recommended transfer for MRI and neurology evaluation.        Past Medical History:   Diagnosis Date    Arthritis 4/6/2023    Essential (primary) hypertension     Hypertensive disorder 4/6/2023       Past Surgical History:   Procedure Laterality Date    CIRCUMCISION      LEG SURGERY      knee repair       Review of patient's allergies indicates:  No Known Allergies    Current Facility-Administered Medications on File Prior to Encounter   Medication    [COMPLETED] aspirin tablet 325 mg    [COMPLETED] clopidogreL tablet 300 mg    " [COMPLETED] iohexoL (OMNIPAQUE 350) injection 70 mL    [COMPLETED] meclizine tablet 25 mg    [COMPLETED] ondansetron injection 4 mg    [DISCONTINUED] clopidogreL tablet 300 mg     Current Outpatient Medications on File Prior to Encounter   Medication Sig    losartan (COZAAR) 25 MG tablet Take 1 tablet (25 mg total) by mouth once daily.    meloxicam (MOBIC) 7.5 MG tablet Take 7.5 mg by mouth once daily.    tamsulosin (FLOMAX) 0.4 mg Cap TAKE 1 CAPSULE BY MOUTH DAILY AT BEDTIME     Family History       Problem Relation (Age of Onset)    Alcohol abuse Father    Emphysema Father    No Known Problems Mother, Sister, Brother, Maternal Grandmother, Maternal Grandfather, Paternal Grandmother, Paternal Grandfather, Maternal Aunt, Maternal Uncle, Paternal Aunt, Paternal Uncle          Tobacco Use    Smoking status: Former     Current packs/day: 0.00     Types: Cigarettes     Start date: 1963     Quit date: 2007     Years since quittin.4    Smokeless tobacco: Never   Substance and Sexual Activity    Alcohol use: Not Currently    Drug use: No    Sexual activity: Not Currently     Review of Systems   Musculoskeletal:  Positive for neck pain (right side, at right sternocleidomastoid) and neck stiffness.   All other systems reviewed and are negative.    Objective:     Vital Signs (Most Recent):  Temp: 97.7 °F (36.5 °C) (24)  Pulse: 60 (24 0349)  Resp: 18 (24)  BP: (!) 148/73 (24)  SpO2: 98 % (24) Vital Signs (24h Range):  Temp:  [97.7 °F (36.5 °C)-98.2 °F (36.8 °C)] 97.7 °F (36.5 °C)  Pulse:  [55-79] 60  Resp:  [16-20] 18  SpO2:  [94 %-100 %] 98 %  BP: (132-149)/(60-73) 148/73     Weight: 76.6 kg (168 lb 14 oz)  Body mass index is 23.55 kg/m².     Physical Exam  Vitals reviewed.   Constitutional:       Appearance: Normal appearance. He is normal weight.   HENT:      Head: Normocephalic.      Mouth/Throat:      Mouth: Mucous membranes are dry.      Pharynx: Oropharynx  is clear.   Eyes:      Pupils: Pupils are equal, round, and reactive to light.   Cardiovascular:      Rate and Rhythm: Normal rate and regular rhythm.      Pulses: Normal pulses.      Heart sounds: Normal heart sounds.   Pulmonary:      Effort: Pulmonary effort is normal.      Breath sounds: Normal breath sounds.   Abdominal:      General: Bowel sounds are normal.      Palpations: Abdomen is soft.   Musculoskeletal:         General: Normal range of motion.      Cervical back: Tenderness (Right-sided neck) present.   Skin:     General: Skin is warm.      Capillary Refill: Capillary refill takes less than 2 seconds.   Neurological:      General: No focal deficit present.      Mental Status: He is alert and oriented to person, place, and time. Mental status is at baseline.   Psychiatric:         Mood and Affect: Mood normal.              CRANIAL NERVES     CN III, IV, VI   Pupils are equal, round, and reactive to light.       Significant Labs: All pertinent labs within the past 24 hours have been reviewed.  Recent Lab Results         06/15/24  1647   06/15/24  1528   06/15/24  1508        Albumin     4.2       ALP     62       ALT     19       Anion Gap     10       AST     23       Baso #     0.02       Basophil %     0.4       BILIRUBIN TOTAL     0.7  Comment: For infants and newborns, interpretation of results should be based  on gestational age, weight and in agreement with clinical  observations.    Premature Infant recommended reference ranges:  Up to 24 hours.............<8.0 mg/dL  Up to 48 hours............<12.0 mg/dL  3-5 days..................<15.0 mg/dL  6-29 days.................<15.0 mg/dL         BUN     17       Calcium     10.1       Chloride     104       Cholesterol Total     146  Comment: The National Cholesterol Education Program (NCEP) has set the  following guidelines (reference ranges) for Cholesterol:  Optimal.....................<200 mg/dL  Borderline High.............200-239  mg/dL  High........................> or = 240 mg/dL         CO2     25       Creatinine     1.0       Differential Method     Automated       eGFR     >60.0       Eos #     0.0       Eos %     0.7       Glucose     110       Gran # (ANC)     3.3       Gran %     60.1       HDL     50  Comment: The National Cholesterol Education Program (NCEP) has set the  following guidelines (reference values) for HDL Cholesterol:  Low...............<40 mg/dL  Optimal...........>60 mg/dL         HDL/Cholesterol Ratio     34.2       Hematocrit     41.7       Hemoglobin     13.5       Immature Grans (Abs)     0.01  Comment: Mild elevation in immature granulocytes is non specific and   can be seen in a variety of conditions including stress response,   acute inflammation, trauma and pregnancy. Correlation with other   laboratory and clinical findings is essential.         Immature Granulocytes     0.2       INR     1.1  Comment: Coumadin Therapy:  2.0 - 3.0 for INR for all indicators except mechanical heart valves  and antiphospholipid syndromes which should use 2.5 - 3.5.  LOT^040^PT Inn^[id number]         LDL Cholesterol     84.8  Comment: The National Cholesterol Education Program (NCEP) has set the  following guidelines (reference values) for LDL Cholesterol:  Optimal.......................<130 mg/dL  Borderline High...............130-159 mg/dL  High..........................160-189 mg/dL  Very High.....................>190 mg/dL         Lymph #     1.7       Lymph %     30.2       MCH     27.1       MCHC     32.4       MCV     84       Mono #     0.5       Mono %     8.4       MPV     10.1       Non-HDL Cholesterol     96  Comment: Risk category and Non-HDL cholesterol goals:  Coronary heart disease (CHD)or equivalent (10-year risk of CHD >20%):  Non-HDL cholesterol goal     <130 mg/dL  Two or more CHD risk factors and 10-year risk of CHD <= 20%:  Non-HDL cholesterol goal     <160 mg/dL  0 to 1 CHD risk factor:  Non-HDL cholesterol  goal     <190 mg/dL         nRBC     0       Platelet Count     219       POCT Glucose   113         Potassium     4.2  Comment: Specimen slightly hemolyzed       PROTEIN TOTAL     7.6       PT     11.4        Acceptable Yes           RBC     4.98       RDW     14.2       SARS-CoV-2 RNA, Amplification, Qual Negative           Sodium     139       Total Cholesterol/HDL Ratio     2.9       Triglycerides     56  Comment: The National Cholesterol Education Program (NCEP) has set the  following guidelines (reference values) for triglycerides:  Normal......................<150 mg/dL  Borderline High.............150-199 mg/dL  High........................200-499 mg/dL         TSH     2.136       WBC     5.47               Significant Imaging: I have reviewed all pertinent imaging results/findings within the past 24 hours.  I have reviewed and interpreted all pertinent imaging results/findings within the past 24 hours.  Assessment/Plan:     Stroke-like symptoms  Patient presented with dizziness outside of window for thrombolytic  CT head with old infarcts which were unknown to patient  CTA head and neck without LVO or stenosis    MRI brain in a.m.   echo ordered   consult neurology  Start aspirin and statin      VTE Risk Mitigation (From admission, onward)           Ordered     Reason for No Pharmacological VTE Prophylaxis  Once        Question:  Reasons:  Answer:  Patient is Ambulatory    06/16/24 0600     IP VTE HIGH RISK PATIENT  Once         06/16/24 0600     Place sequential compression device  Until discontinued         06/16/24 0600                                    Ethan Lauren NP  Department of Hospital Medicine  [facility]

## 2024-06-16 NOTE — CONSULTS
"NEUROLOGY FLOOR CONSULT    Reason for consult:  Stroke rule out    CC:  Dizzy    HPI:     Jack Soria is a 80yo M with a PMH of HTN, OA, and BPH who presented to the [facility] ED on 6/15/2024 with complaints of dizziness. Report waking up with sinus and ear pressure as well as dizziness. Stated dizziness is positional, worse when turning to the left side on the bed. Symptoms began approximately 3 days ago,He has history of CVA." And is on aspirin, Losartan and Lipitor, report non compliance to his medicine. Denies any weakness, numbness, headache, visual  deficit.    ROS: As per HPI    Histories:     Allergies:  Patient has no known allergies.    Current Medications:    Current Facility-Administered Medications   Medication Dose Route Frequency Provider Last Rate Last Admin    aspirin EC tablet 81 mg  81 mg Oral Daily LeonidasDoriss C., NP   81 mg at 06/16/24 0928    atorvastatin tablet 40 mg  40 mg Oral Daily LeonidasYarielEthan C., NP   40 mg at 06/16/24 0928    bisacodyL suppository 10 mg  10 mg Rectal Daily PRN Doris Laurens C., NP        losartan tablet 25 mg  25 mg Oral Daily LeonidasYarielEthan C., NP   25 mg at 06/16/24 0928    ondansetron injection 4 mg  4 mg Intravenous Q12H PRN LeonidasYarielEthan C., NP        sodium chloride 0.9% bolus 500 mL 500 mL  500 mL Intravenous PRN LeonidasYarielEthan C., NP        sodium chloride 0.9% flush 10 mL  10 mL Intravenous PRN LeonidasYarielEthan C., NP        tamsulosin 24 hr capsule 0.4 mg  0.4 mg Oral Daily LeonidasYarielEthan C., NP   0.4 mg at 06/16/24 0928       Past Medical/Surgical/Family History:  Medical:   Past Medical History:   Diagnosis Date    Arthritis 4/6/2023    Essential (primary) hypertension     Hypertensive disorder 4/6/2023      Surgeries:   Past Surgical History:   Procedure Laterality Date    CIRCUMCISION      LEG SURGERY      knee repair      Family:   Family History   Problem Relation Name Age of Onset    No Known Problems Mother          " brain aneurysm    Emphysema Father      Alcohol abuse Father      No Known Problems Sister      No Known Problems Brother      No Known Problems Maternal Grandmother      No Known Problems Maternal Grandfather      No Known Problems Paternal Grandmother      No Known Problems Paternal Grandfather      No Known Problems Maternal Aunt      No Known Problems Maternal Uncle      No Known Problems Paternal Aunt      No Known Problems Paternal Uncle      Amblyopia Neg Hx      Blindness Neg Hx      Cancer Neg Hx      Cataracts Neg Hx      Diabetes Neg Hx      Glaucoma Neg Hx      Hypertension Neg Hx      Macular degeneration Neg Hx      Retinal detachment Neg Hx      Strabismus Neg Hx      Stroke Neg Hx      Thyroid disease Neg Hx         Current Evaluation:     Vital Signs:   Vitals:    06/16/24 1157   BP: 117/70   Pulse: 74   Resp: 18   Temp: 98.5 °F (36.9 °C)        Neurological Examination   Orientation  Alert, awake, oriented to self, place, time, and situation.  Memory  Recent and remote memory intact.  Language  No dysarthria, No aphasia.   Cranial Nerves  PERRL, VF intact, EOMI, V1-V3 intact, symmetric facial expression, hearing grossly intact, SCM & TPZ 5/5, tongue midline, symmetric palate elevation.  Motor  Normal Bulk  Normal Tone  5/5 strength in 4 extremities  Sensory  Normal to light touch throughout  Normal vibration and proprioception  Romberg Negative  DTR   +2 symmetric  Cerebellar/Gait  Normal finger to nose and heel to shin.   Normal gait and stance.     RADIOLOGY STUDIES:  I have personally reviewed the images performed.     HEAD CT: large old R MCA infarct. M2 occlusion on CTA.    BRAIN MRI: Pending      Assessment:  Plan:     Jack Soria is a 78yo M with a PMH of HTN, OA, and BPH who presented to the [facility] ED on 6/15/2024 with complaints of dizziness. Report worsening of symptom upon turning on the left side on the bed. Never had similar event in the past. Etiology of dizziness likely BPPV,  patient with positive Augusto Hallpike. Epley maneuver done with resolution of symptoms. Patient now on a seated position for couple of hours. Likely to repeat same maneuver if symptoms returns.     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  -Rest of care per team     Case discussed with Dr. Nneka Headley MD  [employer] [facility] PGY-4  [employer] [facility] Consult Service

## 2024-06-17 PROBLEM — R42 VERTIGO: Status: ACTIVE | Noted: 2024-06-17

## 2024-06-17 PROBLEM — I25.3 INTERATRIAL SEPTAL ANEURYSM WITH PFO: Status: ACTIVE | Noted: 2024-06-17

## 2024-06-17 PROBLEM — I63.9 CEREBROVASCULAR ACCIDENT (CVA): Status: ACTIVE | Noted: 2024-06-17

## 2024-06-17 PROBLEM — Q21.12 INTERATRIAL SEPTAL ANEURYSM WITH PFO: Status: ACTIVE | Noted: 2024-06-17

## 2024-06-17 LAB
ALBUMIN SERPL BCP-MCNC: 3.6 G/DL (ref 3.5–5.2)
ALP SERPL-CCNC: 59 U/L (ref 55–135)
ALT SERPL W/O P-5'-P-CCNC: 11 U/L (ref 10–44)
ANION GAP SERPL CALC-SCNC: 9 MMOL/L (ref 8–16)
APTT PPP: 27.9 SEC (ref 21–32)
ASCENDING AORTA: 3.37 CM
AST SERPL-CCNC: 17 U/L (ref 10–40)
AV INDEX (PROSTH): 0.69
AV MEAN GRADIENT: 5 MMHG
AV PEAK GRADIENT: 8 MMHG
AV VALVE AREA BY VELOCITY RATIO: 2.73 CM²
AV VALVE AREA: 2.8 CM²
AV VELOCITY RATIO: 0.67
BASOPHILS # BLD AUTO: 0.02 K/UL (ref 0–0.2)
BASOPHILS NFR BLD: 0.5 % (ref 0–1.9)
BILIRUB SERPL-MCNC: 0.5 MG/DL (ref 0.1–1)
BSA FOR ECHO PROCEDURE: 2.01 M2
BUN SERPL-MCNC: 26 MG/DL (ref 8–23)
CALCIUM SERPL-MCNC: 9 MG/DL (ref 8.7–10.5)
CHLORIDE SERPL-SCNC: 106 MMOL/L (ref 95–110)
CO2 SERPL-SCNC: 23 MMOL/L (ref 23–29)
CREAT SERPL-MCNC: 1 MG/DL (ref 0.5–1.4)
CV ECHO LV RWT: 0.5 CM
DIFFERENTIAL METHOD BLD: ABNORMAL
DOP CALC AO PEAK VEL: 1.38 M/S
DOP CALC AO VTI: 24.1 CM
DOP CALC LVOT AREA: 4 CM2
DOP CALC LVOT DIAMETER: 2.27 CM
DOP CALC LVOT PEAK VEL: 0.93 M/S
DOP CALC LVOT STROKE VOLUME: 67.55 CM3
DOP CALC MV VTI: 12 CM
DOP CALCLVOT PEAK VEL VTI: 16.7 CM
E WAVE DECELERATION TIME: 327.84 MSEC
E/A RATIO: 0.61
E/E' RATIO: 7.78 M/S
ECHO LV POSTERIOR WALL: 1.16 CM (ref 0.6–1.1)
EOSINOPHIL # BLD AUTO: 0.1 K/UL (ref 0–0.5)
EOSINOPHIL NFR BLD: 3.1 % (ref 0–8)
ERYTHROCYTE [DISTWIDTH] IN BLOOD BY AUTOMATED COUNT: 14 % (ref 11.5–14.5)
EST. GFR  (NO RACE VARIABLE): >60 ML/MIN/1.73 M^2
FRACTIONAL SHORTENING: 35 % (ref 28–44)
GLUCOSE SERPL-MCNC: 87 MG/DL (ref 70–110)
HCT VFR BLD AUTO: 39.1 % (ref 40–54)
HGB BLD-MCNC: 12.7 G/DL (ref 14–18)
IMM GRANULOCYTES # BLD AUTO: 0.01 K/UL (ref 0–0.04)
IMM GRANULOCYTES NFR BLD AUTO: 0.2 % (ref 0–0.5)
INR PPP: 1.1 (ref 0.8–1.2)
INTERVENTRICULAR SEPTUM: 1.05 CM (ref 0.6–1.1)
IVC DIAMETER: 0.87 CM
LA MAJOR: 5.39 CM
LA MINOR: 5.19 CM
LA WIDTH: 3.5 CM
LEFT ATRIUM SIZE: 3.87 CM
LEFT ATRIUM VOLUME INDEX MOD: 20.3 ML/M2
LEFT ATRIUM VOLUME INDEX: 30.4 ML/M2
LEFT ATRIUM VOLUME MOD: 40.67 CM3
LEFT ATRIUM VOLUME: 60.88 CM3
LEFT INTERNAL DIMENSION IN SYSTOLE: 3.06 CM (ref 2.1–4)
LEFT VENTRICLE DIASTOLIC VOLUME INDEX: 50.61 ML/M2
LEFT VENTRICLE DIASTOLIC VOLUME: 101.21 ML
LEFT VENTRICLE MASS INDEX: 94 G/M2
LEFT VENTRICLE SYSTOLIC VOLUME INDEX: 18.4 ML/M2
LEFT VENTRICLE SYSTOLIC VOLUME: 36.85 ML
LEFT VENTRICULAR INTERNAL DIMENSION IN DIASTOLE: 4.68 CM (ref 3.5–6)
LEFT VENTRICULAR MASS: 187.45 G
LV LATERAL E/E' RATIO: 8.75 M/S
LV SEPTAL E/E' RATIO: 7 M/S
LVOT MG: 1.87 MMHG
LVOT MV: 0.64 CM/S
LYMPHOCYTES # BLD AUTO: 2 K/UL (ref 1–4.8)
LYMPHOCYTES NFR BLD: 48.1 % (ref 18–48)
MAGNESIUM SERPL-MCNC: 1.7 MG/DL (ref 1.6–2.6)
MCH RBC QN AUTO: 26.8 PG (ref 27–31)
MCHC RBC AUTO-ENTMCNC: 32.5 G/DL (ref 32–36)
MCV RBC AUTO: 83 FL (ref 82–98)
MONOCYTES # BLD AUTO: 0.4 K/UL (ref 0.3–1)
MONOCYTES NFR BLD: 10.2 % (ref 4–15)
MV A" WAVE DURATION": 106.57 MSEC
MV MEAN GRADIENT: 0 MMHG
MV PEAK A VEL: 0.57 M/S
MV PEAK E VEL: 0.35 M/S
MV PEAK GRADIENT: 1 MMHG
MV STENOSIS PRESSURE HALF TIME: 95.07 MS
MV VALVE AREA BY CONTINUITY EQUATION: 5.63 CM2
MV VALVE AREA P 1/2 METHOD: 2.31 CM2
NEUTROPHILS # BLD AUTO: 1.6 K/UL (ref 1.8–7.7)
NEUTROPHILS NFR BLD: 37.9 % (ref 38–73)
NRBC BLD-RTO: 0 /100 WBC
OHS LV EJECTION FRACTION SIMPSONS BIPLANE MOD: 66 %
PHOSPHATE SERPL-MCNC: 3.3 MG/DL (ref 2.7–4.5)
PISA TR MAX VEL: 2.37 M/S
PLATELET # BLD AUTO: 220 K/UL (ref 150–450)
PMV BLD AUTO: 10.1 FL (ref 9.2–12.9)
POTASSIUM SERPL-SCNC: 4.1 MMOL/L (ref 3.5–5.1)
PROT SERPL-MCNC: 6.6 G/DL (ref 6–8.4)
PROTHROMBIN TIME: 12.1 SEC (ref 9–12.5)
PULM VEIN S/D RATIO: 1.57
PV MV: 0.67 M/S
PV PEAK D VEL: 0.37 M/S
PV PEAK GRADIENT: 2 MMHG
PV PEAK S VEL: 0.58 M/S
PV PEAK VELOCITY: 0.79 M/S
RA MAJOR: 4.94 CM
RA PRESSURE ESTIMATED: 3 MMHG
RA WIDTH: 4.89 CM
RBC # BLD AUTO: 4.73 M/UL (ref 4.6–6.2)
RV TB RVSP: 5 MMHG
RV TISSUE DOPPLER FREE WALL SYSTOLIC VELOCITY 1 (APICAL 4 CHAMBER VIEW): 14.64 CM/S
SINUS: 4.02 CM
SODIUM SERPL-SCNC: 138 MMOL/L (ref 136–145)
STJ: 3.42 CM
TDI LATERAL: 0.04 M/S
TDI SEPTAL: 0.05 M/S
TDI: 0.05 M/S
TR MAX PG: 22 MMHG
TRICUSPID ANNULAR PLANE SYSTOLIC EXCURSION: 1.8 CM
TROPONIN I SERPL DL<=0.01 NG/ML-MCNC: <0.006 NG/ML (ref 0–0.03)
TV REST PULMONARY ARTERY PRESSURE: 25 MMHG
WBC # BLD AUTO: 4.2 K/UL (ref 3.9–12.7)
Z-SCORE OF LEFT VENTRICULAR DIMENSION IN END DIASTOLE: -2.2
Z-SCORE OF LEFT VENTRICULAR DIMENSION IN END SYSTOLE: -1.24

## 2024-06-17 PROCEDURE — 83735 ASSAY OF MAGNESIUM: CPT | Performed by: REGISTERED NURSE

## 2024-06-17 PROCEDURE — 25000003 PHARM REV CODE 250: Performed by: NURSE PRACTITIONER

## 2024-06-17 PROCEDURE — 99223 1ST HOSP IP/OBS HIGH 75: CPT | Mod: 25,,, | Performed by: STUDENT IN AN ORGANIZED HEALTH CARE EDUCATION/TRAINING PROGRAM

## 2024-06-17 PROCEDURE — 84484 ASSAY OF TROPONIN QUANT: CPT | Performed by: REGISTERED NURSE

## 2024-06-17 PROCEDURE — 84100 ASSAY OF PHOSPHORUS: CPT | Performed by: REGISTERED NURSE

## 2024-06-17 PROCEDURE — 85610 PROTHROMBIN TIME: CPT | Performed by: REGISTERED NURSE

## 2024-06-17 PROCEDURE — 25000003 PHARM REV CODE 250: Performed by: REGISTERED NURSE

## 2024-06-17 PROCEDURE — 36415 COLL VENOUS BLD VENIPUNCTURE: CPT | Performed by: REGISTERED NURSE

## 2024-06-17 PROCEDURE — 97116 GAIT TRAINING THERAPY: CPT | Mod: CQ

## 2024-06-17 PROCEDURE — 85025 COMPLETE CBC W/AUTO DIFF WBC: CPT | Performed by: REGISTERED NURSE

## 2024-06-17 PROCEDURE — 80053 COMPREHEN METABOLIC PANEL: CPT | Performed by: REGISTERED NURSE

## 2024-06-17 PROCEDURE — 85730 THROMBOPLASTIN TIME PARTIAL: CPT | Performed by: REGISTERED NURSE

## 2024-06-17 PROCEDURE — 99900035 HC TECH TIME PER 15 MIN (STAT)

## 2024-06-17 PROCEDURE — 11000001 HC ACUTE MED/SURG PRIVATE ROOM

## 2024-06-17 PROCEDURE — 94761 N-INVAS EAR/PLS OXIMETRY MLT: CPT

## 2024-06-17 PROCEDURE — 97530 THERAPEUTIC ACTIVITIES: CPT | Mod: CQ

## 2024-06-17 RX ORDER — ACETAMINOPHEN 325 MG/1
650 TABLET ORAL EVERY 6 HOURS PRN
Status: DISCONTINUED | OUTPATIENT
Start: 2024-06-17 | End: 2024-06-21 | Stop reason: HOSPADM

## 2024-06-17 RX ORDER — ATORVASTATIN CALCIUM 40 MG/1
80 TABLET, FILM COATED ORAL DAILY
Status: DISCONTINUED | OUTPATIENT
Start: 2024-06-18 | End: 2024-06-21 | Stop reason: HOSPADM

## 2024-06-17 RX ORDER — POLYETHYLENE GLYCOL 3350 17 G/17G
17 POWDER, FOR SOLUTION ORAL DAILY
Status: DISCONTINUED | OUTPATIENT
Start: 2024-06-17 | End: 2024-06-21 | Stop reason: HOSPADM

## 2024-06-17 RX ORDER — ATORVASTATIN CALCIUM 40 MG/1
40 TABLET, FILM COATED ORAL ONCE
Status: COMPLETED | OUTPATIENT
Start: 2024-06-17 | End: 2024-06-17

## 2024-06-17 RX ADMIN — ATORVASTATIN CALCIUM 40 MG: 40 TABLET, FILM COATED ORAL at 08:06

## 2024-06-17 RX ADMIN — BISACODYL 10 MG: 10 SUPPOSITORY RECTAL at 06:06

## 2024-06-17 RX ADMIN — ASPIRIN 81 MG: 81 TABLET, COATED ORAL at 08:06

## 2024-06-17 RX ADMIN — ACETAMINOPHEN 650 MG: 325 TABLET ORAL at 03:06

## 2024-06-17 RX ADMIN — POLYETHYLENE GLYCOL 3350 17 G: 17 POWDER, FOR SOLUTION ORAL at 08:06

## 2024-06-17 RX ADMIN — ATORVASTATIN CALCIUM 40 MG: 40 TABLET, FILM COATED ORAL at 11:06

## 2024-06-17 RX ADMIN — TAMSULOSIN HYDROCHLORIDE 0.4 MG: 0.4 CAPSULE ORAL at 08:06

## 2024-06-17 NOTE — HPI
80yo male with vertigo, dizziness and HTN who presented to the ER with dizziness. He presented to the ER at [facility] with complaints of sinus pressure and ear pain. He reported shaking his head a lot then noting dizziness. The dizziness was noted to be worse with position changes with reports frequent sinus drainage so he presented to the ER. /71 upon admission. Labs WNL. 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 CTA H/N with occlusion right MCA and appearance of old right MCA distribution infarct/old infarct right temporal occipital region. Telestroke consult was placed with plans for transfer to [facility] for further evaluation. Admitted to [facility] Medicine and Cardiology consulted for evaluation of PFO on TTE.

## 2024-06-17 NOTE — SUBJECTIVE & OBJECTIVE
Interval History: seen at the bedside no distress noted.  Discharge is pending MRI.    Review of Systems   Constitutional:  Negative for fatigue.   HENT:  Negative for trouble swallowing.    Respiratory:  Negative for cough and shortness of breath.    Gastrointestinal:  Negative for diarrhea, nausea and vomiting.   Genitourinary:  Negative for difficulty urinating.   Neurological:  Positive for dizziness. Negative for weakness and light-headedness.   Psychiatric/Behavioral:  Negative for confusion.      Objective:     Vital Signs (Most Recent):  Temp: 98 °F (36.7 °C) (06/17/24 0820)  Pulse: 84 (06/17/24 0820)  Resp: 18 (06/17/24 0820)  BP: 98/60 (06/17/24 0820)  SpO2: 98 % (06/17/24 0820) Vital Signs (24h Range):  Temp:  [97.3 °F (36.3 °C)-98.5 °F (36.9 °C)] 98 °F (36.7 °C)  Pulse:  [60-84] 84  Resp:  [18-20] 18  SpO2:  [84 %-98 %] 98 %  BP: ()/(60-77) 98/60     Weight: 80.3 kg (177 lb)  Body mass index is 24.69 kg/m².    Intake/Output Summary (Last 24 hours) at 6/17/2024 1058  Last data filed at 6/17/2024 0751  Gross per 24 hour   Intake 1216 ml   Output 1800 ml   Net -584 ml         Physical Exam  Vitals and nursing note reviewed.   Constitutional:       Appearance: Normal appearance.   HENT:      Mouth/Throat:      Mouth: Mucous membranes are moist.   Eyes:      Extraocular Movements: Extraocular movements intact.   Cardiovascular:      Rate and Rhythm: Normal rate and regular rhythm.      Pulses: Normal pulses.      Heart sounds: Normal heart sounds.   Pulmonary:      Effort: Pulmonary effort is normal.      Breath sounds: Normal breath sounds.   Abdominal:      General: Bowel sounds are normal. There is no distension.      Palpations: Abdomen is soft.      Tenderness: There is no abdominal tenderness. There is no guarding.   Musculoskeletal:         General: Normal range of motion.      Cervical back: Normal range of motion and neck supple.   Skin:     General: Skin is warm and dry.      Capillary Refill:  Capillary refill takes 2 to 3 seconds.   Neurological:      Mental Status: He is alert and oriented to person, place, and time. Mental status is at baseline.   Psychiatric:         Mood and Affect: Mood normal.         Behavior: Behavior normal.             Significant Labs: All pertinent labs within the past 24 hours have been reviewed.    Significant Imaging: I have reviewed all pertinent imaging results/findings within the past 24 hours.

## 2024-06-17 NOTE — ASSESSMENT & PLAN NOTE
- presented with dizziness consistent with vertigo  - Neurology consulted  - PT on board; further recommendations per primary team and Neurology

## 2024-06-17 NOTE — CONSULTS
[facility] - Telemetry  Cardiology  Consult Note    Patient Name: Jack Soria  MRN: [id number]  Admission Date: 6/16/2024  Hospital Length of Stay: 1 days  Code Status: Full Code   Attending Provider: Mary Jo Randall MD   Consulting Provider: LINCOLN An ANP  Primary Care Physician: No primary care provider on file.  Principal Problem:<principal problem not specified>    Patient information was obtained from patient, past medical records, and ER records.     Inpatient consult to Cardiology[facility]  Consult performed by: Lynsey Talley APRN, KAREY  Consult ordered by: Michael Smith NP  Reason for consult: + bubble study PFO      Subjective:     Chief Complaint:  dizziness      HPI:   80yo male with vertigo, dizziness and HTN who presented to the ER with dizziness. He presented to the ER at [facility] with complaints of sinus pressure and ear pain. He reported shaking his head a lot then noting dizziness. The dizziness was noted to be worse with position changes with reports frequent sinus drainage so he presented to the ER. /71 upon admission. Labs WNL. 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 CTA H/N with occlusion right MCA and appearance of old right MCA distribution infarct/old infarct right temporal occipital region. Telestroke consult was placed with plans for transfer to [facility] for further evaluation. Admitted to [facility] Medicine and Cardiology consulted for evaluation of PFO on TTE.     Past Medical History:   Diagnosis Date    Arthritis 4/6/2023    Essential (primary) hypertension     Hypertensive disorder 4/6/2023       Past Surgical History:   Procedure Laterality Date    CIRCUMCISION      LEG SURGERY      knee repair       Review of patient's allergies indicates:  No Known Allergies    No current facility-administered medications on file prior to encounter.     Current Outpatient Medications on  File Prior to Encounter   Medication Sig    losartan (COZAAR) 25 MG tablet Take 1 tablet (25 mg total) by mouth once daily.    meloxicam (MOBIC) 7.5 MG tablet Take 7.5 mg by mouth once daily.    tamsulosin (FLOMAX) 0.4 mg Cap TAKE 1 CAPSULE BY MOUTH DAILY AT BEDTIME     Family History       Problem Relation (Age of Onset)    Alcohol abuse Father    Emphysema Father    No Known Problems Mother, Sister, Brother, Maternal Grandmother, Maternal Grandfather, Paternal Grandmother, Paternal Grandfather, Maternal Aunt, Maternal Uncle, Paternal Aunt, Paternal Uncle          Tobacco Use    Smoking status: Former     Current packs/day: 0.00     Types: Cigarettes     Start date: 1963     Quit date: 2007     Years since quittin.4    Smokeless tobacco: Never   Substance and Sexual Activity    Alcohol use: Not Currently    Drug use: No    Sexual activity: Not Currently     Review of Systems   Constitutional: Negative for chills, decreased appetite, diaphoresis, fever, malaise/fatigue, weight gain and weight loss.   Cardiovascular:  Negative for chest pain, claudication, dyspnea on exertion, irregular heartbeat, leg swelling, near-syncope, orthopnea, palpitations and paroxysmal nocturnal dyspnea.   Respiratory:  Negative for cough, shortness of breath, snoring, sputum production and wheezing.    Endocrine: Negative for cold intolerance, heat intolerance, polydipsia, polyphagia and polyuria.   Skin:  Negative for color change, dry skin, itching, nail changes and poor wound healing.   Musculoskeletal:  Negative for back pain, gout, joint pain and joint swelling.   Gastrointestinal:  Negative for bloating, abdominal pain, constipation, diarrhea, hematemesis, hematochezia, melena, nausea and vomiting.   Genitourinary:  Negative for dysuria, hematuria and nocturia.   Neurological:  Positive for dizziness. Negative for headaches, light-headedness, numbness, paresthesias and weakness.   Psychiatric/Behavioral:   Negative for altered mental status, depression and memory loss.      Objective:     Vital Signs (Most Recent):  Temp: 98.1 °F (36.7 °C) (06/17/24 1217)  Pulse: 74 (06/17/24 1231)  Resp: 20 (06/17/24 1217)  BP: 113/66 (06/17/24 1217)  SpO2: 98 % (06/17/24 1217) Vital Signs (24h Range):  Temp:  [97.3 °F (36.3 °C)-98.1 °F (36.7 °C)] 98.1 °F (36.7 °C)  Pulse:  [60-84] 74  Resp:  [18-20] 20  SpO2:  [84 %-98 %] 98 %  BP: ()/(60-77) 113/66     Weight: 80.3 kg (177 lb 0.5 oz)  Body mass index is 24.69 kg/m².    SpO2: 98 %         Intake/Output Summary (Last 24 hours) at 6/17/2024 1505  Last data filed at 6/17/2024 0751  Gross per 24 hour   Intake 1036 ml   Output 1800 ml   Net -764 ml       Lines/Drains/Airways       Peripheral Intravenous Line  Duration                  Peripheral IV - Single Lumen 06/15/24 1509 18 G Anterior;Left Forearm 1 day                     Physical Exam  Constitutional:       General: He is not in acute distress.     Appearance: He is well-developed.   Cardiovascular:      Rate and Rhythm: Normal rate and regular rhythm.      Heart sounds: No murmur heard.     No gallop.   Pulmonary:      Effort: Pulmonary effort is normal. No respiratory distress.      Breath sounds: Normal breath sounds. No wheezing.   Abdominal:      General: Bowel sounds are normal. There is no distension.      Palpations: Abdomen is soft.      Tenderness: There is no abdominal tenderness.   Skin:     General: Skin is warm and dry.   Neurological:      Mental Status: He is alert and oriented to person, place, and time.          Significant Labs: BMP:   Recent Labs   Lab 06/15/24  1508 06/17/24  0246    87    138   K 4.2 4.1    106   CO2 25 23   BUN 17 26*   CREATININE 1.0 1.0   CALCIUM 10.1 9.0   MG  --  1.7    and CBC   Recent Labs   Lab 06/15/24  1508 06/17/24  0246   WBC 5.47 4.20   HGB 13.5* 12.7*   HCT 41.7 39.1*    220       Significant Imaging: Echocardiogram: Transthoracic echo (TTE)  "complete (Cupid Only):   Results for orders placed or performed during the hospital encounter of 06/16/24   Echo   Result Value Ref Range    BSA 2.01 m2    Franklin's Biplane MOD Ejection Fraction 66 %    LVOT stroke volume 67.55 cm3    LVIDd 4.68 3.5 - 6.0 cm    LV Systolic Volume 36.85 mL    LV Systolic Volume Index 18.4 mL/m2    LVIDs 3.06 2.1 - 4.0 cm    LV Diastolic Volume 101.21 mL    LV Diastolic Volume Index 50.61 mL/m2    IVS 1.05 0.6 - 1.1 cm    LVOT diameter 2.27 cm    LVOT area 4.0 cm2    FS 35 28 - 44 %    Left Ventricle Relative Wall Thickness 0.50 cm    Posterior Wall 1.16 (A) 0.6 - 1.1 cm    LV mass 187.45 g    LV Mass Index 94 g/m2    MV Peak E Fred 0.35 m/s    TDI LATERAL 0.04 m/s    TDI SEPTAL 0.05 m/s    E/E' ratio 7.78 m/s    MV Peak A Fred 0.57 m/s    TR Max Fred 2.37 m/s    E/A ratio 0.61     E wave deceleration time 327.84 msec    MV "A" wave duration [id number] msec    LV SEPTAL E/E' RATIO 7.00 m/s    LV LATERAL E/E' RATIO 8.75 m/s    PV Peak S Fred 0.58 m/s    PV Peak D Fred 0.37 m/s    Pulm vein S/D ratio 1.57     LVOT peak fred 0.93 m/s    Left Ventricular Outflow Tract Mean Velocity 0.64 cm/s    Left Ventricular Outflow Tract Mean Gradient 1.87 mmHg    RV S' 14.64 cm/s    TAPSE 1.80 cm    LA size 3.87 cm    Left Atrium Minor Axis 5.19 cm    Left Atrium Major Axis 5.39 cm    LA volume (mod) 40.67 cm3    LA Volume Index (Mod) 20.3 mL/m2    RA Major Axis 4.94 cm    RA Width 4.89 cm    AV mean gradient 5 mmHg    AV peak gradient 8 mmHg    Ao peak fred 1.38 m/s    Ao VTI 24.10 cm    LVOT peak VTI 16.70 cm    AV valve area 2.80 cm²    AV Velocity Ratio 0.67     AV index (prosthetic) 0.69     LANA by Velocity Ratio 2.73 cm²    MV mean gradient 0 mmHg    MV peak gradient 1 mmHg    MV stenosis pressure 1/2 time 95.07 ms    MV valve area p 1/2 method 2.31 cm2    MV valve area by continuity eq 5.63 cm2    MV VTI 12.0 cm    Triscuspid Valve Regurgitation Peak Gradient 22 mmHg    PV PEAK VELOCITY " 0.79 m/s    PV peak gradient 2 mmHg    Pulmonary Valve Mean Velocity 0.67 m/s    Sinus 4.02 cm    STJ 3.42 cm    Ascending aorta 3.37 cm    IVC diameter 0.87 cm    Mean e' 0.05 m/s    ZLVIDS -1.24     ZLVIDD -2.20     LA Volume Index 30.4 mL/m2    LA volume 60.88 cm3    LA WIDTH 3.5 cm    TV resting pulmonary artery pressure 25 mmHg    RV TB RVSP 5 mmHg    Est. RA pres 3 mmHg    Narrative      Left Ventricle: The left ventricle is normal in size. There is   concentric remodeling. There is normal systolic function. Biplane (2D)   method of discs ejection fraction is 66%. There is normal diastolic   function.    Right Ventricle: Normal right ventricular cavity size. Systolic   function is normal. TAPSE is 1.80 cm.    Left Atrium: There is an atrial septal aneurysm.    Tricuspid Valve: There is mild regurgitation.    Pulmonic Valve: There is mild regurgitation.    Pulmonary Artery: The estimated pulmonary artery systolic pressure is   25 mmHg.    IVC/SVC: Normal venous pressure at 3 mmHg.    Pericardium: There is a small effusion. No indication of cardiac   tamponade.       Assessment and Plan:     Interatrial septal aneurysm with PFO  - echo with normal LVEF with atrial septal aneurysm and +bubble study  - presented with dizziness consistent with vertigo  - no previous echo available; CT imaging with appearance of old CVA; CTA with no large vessel occlusion  - no indication at this time for PFO closure; continue ASA statin and BP management  - follow up with Cardiology as an outpatient     Vertigo  - presented with dizziness consistent with vertigo  - Neurology consulted  - PT on board; further recommendations per primary team and Neurology         VTE Risk Mitigation (From admission, onward)           Ordered     Reason for No Pharmacological VTE Prophylaxis  Once        Question:  Reasons:  Answer:  Patient is Ambulatory    06/16/24 0600     IP VTE HIGH RISK PATIENT  Once         06/16/24 0600     Place  sequential compression device  Until discontinued         06/16/24 0600                    Thank you for your consult. I will sign off. Please contact us if you have any additional questions.    LINCOLN An, ANP  Cardiology   [facility] - Telemetry

## 2024-06-17 NOTE — PLAN OF CARE
Problem: Adult Inpatient Plan of Care  Goal: Plan of Care Review  6/16/2024 1941 by Elisabeth Willard RN  Outcome: Progressing  6/16/2024 1939 by Elisabeth Willard RN  Outcome: Progressing     Problem: Adult Inpatient Plan of Care  Goal: Optimal Comfort and Wellbeing  6/16/2024 1941 by Elisabeth Willard RN  Outcome: Progressing  6/16/2024 1939 by Elisabeth Willard RN  Outcome: Progressing     Problem: Fall Injury Risk  Goal: Absence of Fall and Fall-Related Injury  6/16/2024 1941 by Elisabeth Willard RN  Outcome: Progressing  6/16/2024 1939 by Elisabeth Willard RN  Outcome: Progressing

## 2024-06-17 NOTE — PROGRESS NOTES
"[facility] Medicine  Progress Note    Patient Name: Jack Soria  MRN: [id number]  Patient Class: IP- Inpatient   Admission Date: 6/16/2024  Length of Stay: 1 days  Attending Physician: Mary Jo Randall MD  Primary Care Provider: No primary care provider on file.        Subjective:     Principal Problem:<principal problem not specified>        HPI:  Jack Soria is a 80yo M with a PMH of HTN, OA, and BPH who presented to the [facility] ED on 6/15/2024 with complaints of dizziness. Per referring NP: "woke two days ago with sinus and ear pressure. Upon noticing symptoms he began "shaking my head a lot" and since then has dizziness which is positional, worse from reclined position to siting or standing. He has several days where" I am getting a quart of mucus out my head a day", cough and worsening dizziness. Dizziness symptoms began approximately 3 days ago, however could be as a 4 out as a week. He has no previous history of CVA."     In the ED, vitals were stable: /71 though otherwise unremarkable. Labs grossly unrevealing. 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 Telestroke consult was placed; pt out of the tPN window. Dr. Estrada recommended transfer for MRI and neurology evaluation.        Overview/Hospital Course:  No notes on file    Interval History: seen at the bedside no distress noted.  Discharge is pending MRI.    Review of Systems   Constitutional:  Negative for fatigue.   HENT:  Negative for trouble swallowing.    Respiratory:  Negative for cough and shortness of breath.    Gastrointestinal:  Negative for diarrhea, nausea and vomiting.   Genitourinary:  Negative for difficulty urinating.   Neurological:  Positive for dizziness. Negative for weakness and light-headedness.   Psychiatric/Behavioral:  Negative for confusion.      Objective: "     Vital Signs (Most Recent):  Temp: 98 °F (36.7 °C) (06/17/24 0820)  Pulse: 84 (06/17/24 0820)  Resp: 18 (06/17/24 0820)  BP: 98/60 (06/17/24 0820)  SpO2: 98 % (06/17/24 0820) Vital Signs (24h Range):  Temp:  [97.3 °F (36.3 °C)-98.5 °F (36.9 °C)] 98 °F (36.7 °C)  Pulse:  [60-84] 84  Resp:  [18-20] 18  SpO2:  [84 %-98 %] 98 %  BP: ()/(60-77) 98/60     Weight: 80.3 kg (177 lb)  Body mass index is 24.69 kg/m².    Intake/Output Summary (Last 24 hours) at 6/17/2024 1058  Last data filed at 6/17/2024 0751  Gross per 24 hour   Intake 1216 ml   Output 1800 ml   Net -584 ml         Physical Exam  Vitals and nursing note reviewed.   Constitutional:       Appearance: Normal appearance.   HENT:      Mouth/Throat:      Mouth: Mucous membranes are moist.   Eyes:      Extraocular Movements: Extraocular movements intact.   Cardiovascular:      Rate and Rhythm: Normal rate and regular rhythm.      Pulses: Normal pulses.      Heart sounds: Normal heart sounds.   Pulmonary:      Effort: Pulmonary effort is normal.      Breath sounds: Normal breath sounds.   Abdominal:      General: Bowel sounds are normal. There is no distension.      Palpations: Abdomen is soft.      Tenderness: There is no abdominal tenderness. There is no guarding.   Musculoskeletal:         General: Normal range of motion.      Cervical back: Normal range of motion and neck supple.   Skin:     General: Skin is warm and dry.      Capillary Refill: Capillary refill takes 2 to 3 seconds.   Neurological:      Mental Status: He is alert and oriented to person, place, and time. Mental status is at baseline.   Psychiatric:         Mood and Affect: Mood normal.         Behavior: Behavior normal.             Significant Labs: All pertinent labs within the past 24 hours have been reviewed.    Significant Imaging: I have reviewed all pertinent imaging results/findings within the past 24 hours.    Assessment/Plan:      Vertigo    Appreciate neurology's  evaluation  Symptoms appear more in the range of vertigo however for complete list will obtain MRI    Stroke-like symptoms  Patient presented with dizziness outside of window for thrombolytic  CT head with old infarcts which were unknown to patient  CTA head and neck without LVO or stenosis    MRI brain still pending.   echo ordered   consult neurology  Start aspirin and high-intensity statin    Benign prostatic hyperplasia with urinary hesitancy  Resume Flomax        VTE Risk Mitigation (From admission, onward)           Ordered     Reason for No Pharmacological VTE Prophylaxis  Once        Question:  Reasons:  Answer:  Patient is Ambulatory    06/16/24 0600     IP VTE HIGH RISK PATIENT  Once         06/16/24 0600     Place sequential compression device  Until discontinued         06/16/24 0600                    Discharge Planning   SOPHIA:      Code Status: Full Code   Is the patient medically ready for discharge?:     Reason for patient still in hospital (select all that apply): Laboratory test, Treatment, Imaging, Consult recommendations, and PT / OT recommendations                     Michael Smith NP  Department of Hospital Medicine   [facility]

## 2024-06-17 NOTE — PLAN OF CARE
Problem: Adult Inpatient Plan of Care  Goal: Plan of Care Review  Outcome: Progressing  Goal: Patient-Specific Goal (Individualized)  Outcome: Progressing  Goal: Absence of Hospital-Acquired Illness or Injury  Outcome: Progressing  Goal: Optimal Comfort and Wellbeing  Outcome: Progressing  Goal: Readiness for Transition of Care  Outcome: Progressing     Problem: Stroke, Ischemic (Includes Transient Ischemic Attack)  Goal: Optimal Coping  Outcome: Progressing  Goal: Effective Bowel Elimination  Outcome: Progressing  Goal: Optimal Cerebral Tissue Perfusion  Outcome: Progressing  Goal: Optimal Cognitive Function  Outcome: Progressing  Goal: Improved Communication Skills  Outcome: Progressing  Goal: Optimal Functional Ability  Outcome: Progressing  Goal: Optimal Nutrition Intake  Outcome: Progressing  Goal: Effective Oxygenation and Ventilation  Outcome: Progressing  Goal: Improved Sensorimotor Function  Outcome: Progressing  Goal: Safe and Effective Swallow  Outcome: Progressing  Goal: Effective Urinary Elimination  Outcome: Progressing     Problem: Comorbidity Management  Goal: Blood Pressure in Desired Range  Outcome: Progressing  Goal: Maintenance of Osteoarthritis Symptom Control  Outcome: Progressing     Problem: Fall Injury Risk  Goal: Absence of Fall and Fall-Related Injury  Outcome: Progressing     Problem: Infection  Goal: Absence of Infection Signs and Symptoms  Outcome: Progressing     Problem: Nonall[place]  Goal: Collaboration with the Healthcare Team  Outcome: Progressing

## 2024-06-17 NOTE — PT/OT/SLP PROGRESS
Physical Therapy Treatment    Patient Name:  Jack Soria   MRN:  [id number]    Recommendations:     Discharge Recommendations: Low Intensity Therapy  Discharge Equipment Recommendations: walker, rolling  Barriers to discharge: Decreased caregiver support    Assessment:     Jack Soria is a 79 y.o. male admitted with a medical diagnosis of <principal problem not specified>.  He presents with the following impairments/functional limitations: weakness, impaired endurance, impaired self care skills, impaired functional mobility, gait instability, decreased lower extremity function, impaired cardiopulmonary response to activity Pt would continue to benefit from P.T. To address impairments listed above.  .    Rehab Prognosis: Fair; patient would benefit from acute skilled PT services to address these deficits and reach maximum level of function.    Recent Surgery: * No surgery found *      Plan:     During this hospitalization, patient to be seen 4 x/week to address the identified rehab impairments via gait training, therapeutic activities, therapeutic exercises, neuromuscular re-education and progress toward the following goals:    Plan of Care Expires:  07/07/24    Subjective       Patient/Family Comments/goals: Pt agreed to tx.  Pain/Comfort:  Pain Rating 1: 0/10  Pain Rating Post-Intervention 1: 0/10      Objective:     Communicated with RN prior to session.  Patient found HOB elevated with bed alarm, telemetry upon PT entry to room.     General Precautions: Standard, fall  Orthopedic Precautions:    Braces:    Respiratory Status: Room air     Functional Mobility:  Bed Mobility:     Rolling Left:  modified independence  Scooting: modified independence  Supine to Sit: modified independence  Sit to Supine: modified independence  Transfers:     Sit to Stand:  contact guard assistance with rolling walker and vc's for hand placement  Gait: 100ft x 2 with rw and CGA with a brief standing rest between bouts.  Pt  ambulates with decreased step length, decreased heel strike, and decreased foot to floor clearance, L > R.    Balance: sitting good, standing fair, gait fair      AM-PAC 6 CLICK MOBILITY  Turning over in bed (including adjusting bedclothes, sheets and blankets)?: 4  Sitting down on and standing up from a chair with arms (e.g., wheelchair, bedside commode, etc.): 3  Moving from lying on back to sitting on the side of the bed?: 4  Moving to and from a bed to a chair (including a wheelchair)?: 3  Need to walk in hospital room?: 3  Climbing 3-5 steps with a railing?: 3  Basic Mobility Total Score: 20       Treatment & Education:  Pt denied dizziness during tx today, but was instructed on slower movement with functional mobility for increased safety and to assist with dizziness not recurring. Pt sat EOb and performed BLE therex: AP, LAQs, hip flexion 12 x 2 reps with assist on L for increased R knee extension secondary to weakness.  Pt reports having had surgery ~20 years ago secondary to a ligament injury in his knee.  VC's for increased awareness of foot clearance of floor especially on left for increased safety with gait.  Pt declined sitting up in b/s chair at end of tx and returned to bed supine with HOB elevated.    Patient left HOB elevated with all lines intact, call button in reach, bed alarm on, and RN notified..    GOALS:   Multidisciplinary Problems       Physical Therapy Goals          Problem: Physical Therapy    Goal Priority Disciplines Outcome Goal Variances Interventions   Physical Therapy Goal     PT, PT/OT Progressing     Description: Goals to be met by: 2024     Patient will increase functional independence with mobility by performin. Supine to sit with Modified [place]  2. Sit to supine with Modified [place]  4. Rolling with Modified [place].  5. Sit to stand transfer Mod I using Rolling Walker  6. Bed to chair transfer with Mod I Rolling Walker  7. Gait  2 x 150 feet Mod I  using Rolling Walker.   8. Ascend/descend 4 stair with bilateral Handrails Stand-by Assistance   9. Lower extremity exercise program  2 x10 reps with supervision                          Time Tracking:     PT Received On: 06/17/24  PT Start Time: 1431     PT Stop Time: 1455  PT Total Time (min): 24 min     Billable Minutes: Gait Training 13 and Therapeutic Activity 11    Treatment Type: Treatment  PT/PTA: PTA     Number of PTA visits since last PT visit: 1 06/17/2024

## 2024-06-17 NOTE — PLAN OF CARE
CM met with pt at bedside to discuss discharge planning. Pt lives alone. Has family that he sees and talks to often. Pt is independent with ADL's. Pt has no DME/HH services. He drives himself to appointments or where ever he needs to go. He stated he walked with PT with no DME or issues.  Pt. was transferred from [facility] via ambulance and upon discharge, he will need hospital transportation to return home in [facility], [place]. CM put name and number on pt white board. Pt made aware to contact CM with any questions or concerns. Cm will continue to follow and assist with any discharge needs. He is requesting a PCP close to his home. Request sent to Anita Raymond for scheduling.   [facility] - Telemetry  Initial Discharge Assessment       Primary Care Provider: No primary care provider on file.    Admission Diagnosis: CVA (cerebral vascular accident) [I63.9]    Admission Date: 6/16/2024  Expected Discharge Date:          Payor: [facility] MCARE / Plan: [facility] DUAL COMPLETE HMO SNP / Product Type: Medicare Advantage /     Extended Emergency Contact Information  Primary Emergency Contact: Flaca Velázquez   [facility]  Home Phone: [phone]  Relation: Significant other    Discharge Plan A: (P) Home, Home with family  Discharge Plan B: (P) Home, Home with family, Home Health      [id number] - [place], [place] - [address] AT [facility] & [address] [place] [address]  Phone: [phone] Fax: [phone]      Initial Assessment (most recent)       Adult Discharge Assessment - 06/17/24 1000          Discharge Assessment    Assessment Type Discharge Planning Assessment (P)      Confirmed/corrected address, phone number and insurance Yes (P)      Confirmed Demographics Correct on Facesheet (P)      Source of Information patient (P)      Communicated SOPHIA with patient/caregiver Date not available/Unable to determine (P)      Reason For Admission CVA (P)      People  in Home alone (P)      Facility Arrived From: [facility] (P)      Do you expect to return to your current living situation? Yes (P)      Do you have help at home or someone to help you manage your care at home? No (P)      Prior to hospitilization cognitive status: Alert/Oriented (P)      Current cognitive status: Alert/Oriented (P)      Walking or Climbing Stairs Difficulty no (P)      Dressing/Bathing Difficulty no (P)      Equipment Currently Used at Home none (P)      Readmission within 30 days? No (P)      Patient currently being followed by outpatient case management? No (P)      Do you currently have service(s) that help you manage your care at home? No (P)      Do you take prescription medications? Yes (P)      Do you have prescription coverage? Yes (P)      Coverage [facility] (P)      Do you have any problems affording any of your prescribed medications? No (P)      Is the patient taking medications as prescribed? yes (P)      Who is going to help you get home at discharge? Need hospital transport (P)      How do you get to doctors appointments? car, drives self (P)      Are you on dialysis? No (P)      Do you take coumadin? No (P)      Discharge Plan A Home;Home with family (P)      Discharge Plan B Home;Home with family;Home Health (P)      DME Needed Upon Discharge  none (P)      Discharge Plan discussed with: Patient (P)         Physical Activity    On average, how many days per week do you engage in moderate to strenuous exercise (like a brisk walk)? 0 days (P)      On average, how many minutes do you engage in exercise at this level? 0 min (P)         Financial Resource Strain    How hard is it for you to pay for the very basics like food, housing, medical care, and heating? Not hard at all (P)         Housing Stability    In the last 12 months, was there a time when you were not able to pay the mortgage or rent on time? No (P)      At any time in the past 12 months, were you homeless or  living in a shelter (including now)? No (P)         Transportation Needs    Has the lack of transportation kept you from medical appointments, meetings, work or from getting things needed for daily living? No (P)         Food Insecurity    Within the past 12 months, you worried that your food would run out before you got the money to buy more. Never true (P)      Within the past 12 months, the food you bought just didn't last and you didn't have money to get more. Never true (P)         Stress    Do you feel stress - tense, restless, nervous, or anxious, or unable to sleep at night because your mind is troubled all the time - these days? Not at all (P)         Social Isolation    How often do you feel lonely or isolated from those around you?  Rarely (P)         Alcohol Use    Q1: How often do you have a drink containing alcohol? Never (P)      Q2: How many drinks containing alcohol do you have on a typical day when you are drinking? Patient does not drink (P)      Q3: How often do you have six or more drinks on one occasion? Never (P)         Utilities    In the past 12 months has the electric, gas, oil, or water company threatened to shut off services in your home? No (P)         Health Literacy    How often do you need to have someone help you when you read instructions, pamphlets, or other written material from your doctor or pharmacy? Rarely (P)

## 2024-06-17 NOTE — SUBJECTIVE & OBJECTIVE
Past Medical History:   Diagnosis Date    Arthritis 2023    Essential (primary) hypertension     Hypertensive disorder 2023       Past Surgical History:   Procedure Laterality Date    CIRCUMCISION      LEG SURGERY      knee repair       Review of patient's allergies indicates:  No Known Allergies    No current facility-administered medications on file prior to encounter.     Current Outpatient Medications on File Prior to Encounter   Medication Sig    losartan (COZAAR) 25 MG tablet Take 1 tablet (25 mg total) by mouth once daily.    meloxicam (MOBIC) 7.5 MG tablet Take 7.5 mg by mouth once daily.    tamsulosin (FLOMAX) 0.4 mg Cap TAKE 1 CAPSULE BY MOUTH DAILY AT BEDTIME     Family History       Problem Relation (Age of Onset)    Alcohol abuse Father    Emphysema Father    No Known Problems Mother, Sister, Brother, Maternal Grandmother, Maternal Grandfather, Paternal Grandmother, Paternal Grandfather, Maternal Aunt, Maternal Uncle, Paternal Aunt, Paternal Uncle          Tobacco Use    Smoking status: Former     Current packs/day: 0.00     Types: Cigarettes     Start date: 1963     Quit date: 2007     Years since quittin.4    Smokeless tobacco: Never   Substance and Sexual Activity    Alcohol use: Not Currently    Drug use: No    Sexual activity: Not Currently     Review of Systems   Constitutional: Negative for chills, decreased appetite, diaphoresis, fever, malaise/fatigue, weight gain and weight loss.   Cardiovascular:  Negative for chest pain, claudication, dyspnea on exertion, irregular heartbeat, leg swelling, near-syncope, orthopnea, palpitations and paroxysmal nocturnal dyspnea.   Respiratory:  Negative for cough, shortness of breath, snoring, sputum production and wheezing.    Endocrine: Negative for cold intolerance, heat intolerance, polydipsia, polyphagia and polyuria.   Skin:  Negative for color change, dry skin, itching, nail changes and poor wound healing.   Musculoskeletal:   Negative for back pain, gout, joint pain and joint swelling.   Gastrointestinal:  Negative for bloating, abdominal pain, constipation, diarrhea, hematemesis, hematochezia, melena, nausea and vomiting.   Genitourinary:  Negative for dysuria, hematuria and nocturia.   Neurological:  Positive for dizziness. Negative for headaches, light-headedness, numbness, paresthesias and weakness.   Psychiatric/Behavioral:  Negative for altered mental status, depression and memory loss.      Objective:     Vital Signs (Most Recent):  Temp: 98.1 °F (36.7 °C) (06/17/24 1217)  Pulse: 74 (06/17/24 1231)  Resp: 20 (06/17/24 1217)  BP: 113/66 (06/17/24 1217)  SpO2: 98 % (06/17/24 1217) Vital Signs (24h Range):  Temp:  [97.3 °F (36.3 °C)-98.1 °F (36.7 °C)] 98.1 °F (36.7 °C)  Pulse:  [60-84] 74  Resp:  [18-20] 20  SpO2:  [84 %-98 %] 98 %  BP: ()/(60-77) 113/66     Weight: 80.3 kg (177 lb 0.5 oz)  Body mass index is 24.69 kg/m².    SpO2: 98 %         Intake/Output Summary (Last 24 hours) at 6/17/2024 1505  Last data filed at 6/17/2024 0751  Gross per 24 hour   Intake 1036 ml   Output 1800 ml   Net -764 ml       Lines/Drains/Airways       Peripheral Intravenous Line  Duration                  Peripheral IV - Single Lumen 06/15/24 1509 18 G Anterior;Left Forearm 1 day                     Physical Exam  Constitutional:       General: He is not in acute distress.     Appearance: He is well-developed.   Cardiovascular:      Rate and Rhythm: Normal rate and regular rhythm.      Heart sounds: No murmur heard.     No gallop.   Pulmonary:      Effort: Pulmonary effort is normal. No respiratory distress.      Breath sounds: Normal breath sounds. No wheezing.   Abdominal:      General: Bowel sounds are normal. There is no distension.      Palpations: Abdomen is soft.      Tenderness: There is no abdominal tenderness.   Skin:     General: Skin is warm and dry.   Neurological:      Mental Status: He is alert and oriented to person, place, and  "time.          Significant Labs: BMP:   Recent Labs   Lab 06/15/24  1508 06/17/24  0246    87    138   K 4.2 4.1    106   CO2 25 23   BUN 17 26*   CREATININE 1.0 1.0   CALCIUM 10.1 9.0   MG  --  1.7    and CBC   Recent Labs   Lab 06/15/24  1508 06/17/24  0246   WBC 5.47 4.20   HGB 13.5* 12.7*   HCT 41.7 39.1*    220       Significant Imaging: Echocardiogram: Transthoracic echo (TTE) complete (Cupid Only):   Results for orders placed or performed during the hospital encounter of 06/16/24   Echo   Result Value Ref Range    BSA 2.01 m2    Franklin's Biplane MOD Ejection Fraction 66 %    LVOT stroke volume 67.55 cm3    LVIDd 4.68 3.5 - 6.0 cm    LV Systolic Volume 36.85 mL    LV Systolic Volume Index 18.4 mL/m2    LVIDs 3.06 2.1 - 4.0 cm    LV Diastolic Volume 101.21 mL    LV Diastolic Volume Index 50.61 mL/m2    IVS 1.05 0.6 - 1.1 cm    LVOT diameter 2.27 cm    LVOT area 4.0 cm2    FS 35 28 - 44 %    Left Ventricle Relative Wall Thickness 0.50 cm    Posterior Wall 1.16 (A) 0.6 - 1.1 cm    LV mass 187.45 g    LV Mass Index 94 g/m2    MV Peak E Fred 0.35 m/s    TDI LATERAL 0.04 m/s    TDI SEPTAL 0.05 m/s    E/E' ratio 7.78 m/s    MV Peak A Fred 0.57 m/s    TR Max Fred 2.37 m/s    E/A ratio 0.61     E wave deceleration time 327.84 msec    MV "A" wave duration [id number] msec    LV SEPTAL E/E' RATIO 7.00 m/s    LV LATERAL E/E' RATIO 8.75 m/s    PV Peak S Fred 0.58 m/s    PV Peak D Fred 0.37 m/s    Pulm vein S/D ratio 1.57     LVOT peak fred 0.93 m/s    Left Ventricular Outflow Tract Mean Velocity 0.64 cm/s    Left Ventricular Outflow Tract Mean Gradient 1.87 mmHg    RV S' 14.64 cm/s    TAPSE 1.80 cm    LA size 3.87 cm    Left Atrium Minor Axis 5.19 cm    Left Atrium Major Axis 5.39 cm    LA volume (mod) 40.67 cm3    LA Volume Index (Mod) 20.3 mL/m2    RA Major Axis 4.94 cm    RA Width 4.89 cm    AV mean gradient 5 mmHg    AV peak gradient 8 mmHg    Ao peak fred 1.38 m/s    Ao VTI 24.10 cm    LVOT " peak VTI 16.70 cm    AV valve area 2.80 cm²    AV Velocity Ratio 0.67     AV index (prosthetic) 0.69     LANA by Velocity Ratio 2.73 cm²    MV mean gradient 0 mmHg    MV peak gradient 1 mmHg    MV stenosis pressure 1/2 time 95.07 ms    MV valve area p 1/2 method 2.31 cm2    MV valve area by continuity eq 5.63 cm2    MV VTI 12.0 cm    Triscuspid Valve Regurgitation Peak Gradient 22 mmHg    PV PEAK VELOCITY 0.79 m/s    PV peak gradient 2 mmHg    Pulmonary Valve Mean Velocity 0.67 m/s    Sinus 4.02 cm    STJ 3.42 cm    Ascending aorta 3.37 cm    IVC diameter 0.87 cm    Mean e' 0.05 m/s    ZLVIDS -1.24     ZLVIDD -2.20     LA Volume Index 30.4 mL/m2    LA volume 60.88 cm3    LA WIDTH 3.5 cm    TV resting pulmonary artery pressure 25 mmHg    RV TB RVSP 5 mmHg    Est. RA pres 3 mmHg    Narrative      Left Ventricle: The left ventricle is normal in size. There is   concentric remodeling. There is normal systolic function. Biplane (2D)   method of discs ejection fraction is 66%. There is normal diastolic   function.    Right Ventricle: Normal right ventricular cavity size. Systolic   function is normal. TAPSE is 1.80 cm.    Left Atrium: There is an atrial septal aneurysm.    Tricuspid Valve: There is mild regurgitation.    Pulmonic Valve: There is mild regurgitation.    Pulmonary Artery: The estimated pulmonary artery systolic pressure is   25 mmHg.    IVC/SVC: Normal venous pressure at 3 mmHg.    Pericardium: There is a small effusion. No indication of cardiac   tamponade.

## 2024-06-17 NOTE — CONSULTS
[facility] - Telemetry  Adult Nutrition  Consult Note    SUMMARY     Recommendations    Recommendation:  1. Encourage intake at meals as tolerated. 2. Monitor weight/labs.   3. RD to follow to monitor po intake    Goals:  Pt will tolerate diet with at least 50-75% intake at meals by RD follow up  Nutrition Goal Status: new  Communication of RD Recs: reviewed with RN    Assessment and Plan  No nutrition dx at this time     Malnutrition Assessment    Orbital Region (Subcutaneous Fat Loss): well nourished  Upper Arm Region (Subcutaneous Fat Loss): well nourished  Thoracic and Lumbar Region: well nourished   [place] Region (Muscle Loss): well nourished  Clavicle Bone Region (Muscle Loss): well nourished  Clavicle and Acromion Bone Region (Muscle Loss): well nourished  Scapular Bone Region (Muscle Loss): well nourished  Dorsal Hand (Muscle Loss): well nourished  Patellar Region (Muscle Loss): well nourished  Anterior Thigh Region (Muscle Loss): well nourished  Posterior Calf Region (Muscle Loss): well nourished       Reason for Assessment  Reason For Assessment: consult (lost 10lb in 2 months, stroke pathway)  Diagnosis:  (stroke)  Relevant Medical History: HTN, arthritis  General Information Comments: Pt on Cardiac diet with % intake at meals. Discussed inportance of low Na diet at home. Reviewed over foods high in sodium to avoid and cooking without salt. Aaron 20-skin intact. Weight inaccuracy: 6/16= 168lb and 6/17= 177lb. NFPE completed today 6/17-nourished at this time. Pt reports good intake at home and drinks Boost at home.  Nutrition Discharge Planning: pt to d/c on Cardiac diet    Nutrition Risk Screen  Nutrition Risk Screen: no indicators present    Nutrition/Diet History  Food Preferences: no Temple or cultural food prefs identified  Spiritual, Cultural Beliefs, [employer] Practices, Values that Affect Care: no  Factors Affecting Nutritional Intake: None identified at this time    Anthropometrics  Temp:  "98 °F (36.7 °C)  Height Method: Stated  Height: 5' 11" (180.3 cm)  Height (inches): 71 in  Weight Method: Bed Scale  Weight: 80.3 kg (177 lb 0.5 oz)  Weight (lb): 177.03 lb  Ideal Body Weight (IBW), Male: 172 lb  % Ideal Body Weight, Male (lb): 102.92 %  BMI (Calculated): 24.7  BMI Grade: 18.5-24.9 - normal  Usual Body Weight (UBW), k.9 kg (2/15)  % Usual Body Weight: 101.99  % Weight Change From Usual Weight: 1.77 %     Lab/Procedures/Meds  Pertinent Labs Reviewed: reviewed  Pertinent Labs Comments: BUN 26H  Pertinent Medications Reviewed: reviewed  Pertinent Medications Comments: aspirin    Estimated/Assessed Needs  Weight Used For Calorie Calculations: 80.3 kg (177 lb 0.5 oz)  Energy Calorie Requirements (kcal): 6395-7315 (25-30 kcal/kg)  Energy Need Method: Kcal/kg  Protein Requirements: 80g (1.0g/kg)  Weight Used For Protein Calculations: 80.3 kg (177 lb 0.5 oz)  Estimated Fluid Requirement Method: RDA Method  RDA Method (mL):      Nutrition Prescription Ordered  Current Diet Order: Cardiac    Evaluation of Received Nutrient/Fluid Intake  I/O: 1220/2000  Energy Calories Required: meeting needs  Protein Required: meeting needs  Fluid Required: meeting needs  Comments: LBM 6/16  % Intake of Estimated Energy Needs: 75 - 100 %  % Meal Intake: 75 - 100 %    Nutrition Risk  Level of Risk/Frequency of Follow-up:  (2xweekly)     Monitor and Evaluation  Food and Nutrient Intake: food and beverage intake  Food and Nutrient Adminstration: diet order  Physical Activity and Function: nutrition-related ADLs and IADLs  Anthropometric Measurements: weight  Biochemical Data, Medical Tests and Procedures: electrolyte and renal panel  Nutrition-Focused Physical Findings: overall appearance     Nutrition Related Social Determinants of Health: SDOH: Adequate food in home environment     Nutrition Follow-Up    RD Follow-up?: Yes    "

## 2024-06-17 NOTE — ASSESSMENT & PLAN NOTE
- echo with normal LVEF with atrial septal aneurysm and +bubble study  - presented with dizziness consistent with vertigo  - no previous echo available; CT imaging with appearance of old CVA; CTA with no large vessel occlusion  - no indication at this time for PFO closure; continue ASA statin and BP management  - follow up with Cardiology as an outpatient

## 2024-06-17 NOTE — ASSESSMENT & PLAN NOTE
Patient presented with dizziness outside of window for thrombolytic  CT head with old infarcts which were unknown to patient  CTA head and neck without LVO or stenosis    MRI brain still pending.   echo ordered   consult neurology  Start aspirin and high-intensity statin

## 2024-06-17 NOTE — PLAN OF CARE
Problem: Adult Inpatient Plan of Care  Goal: Plan of Care Review  Outcome: Progressing     Problem: Adult Inpatient Plan of Care  Goal: Optimal Comfort and Wellbeing  Outcome: Progressing     Problem: Fall Injury Risk  Goal: Absence of Fall and Fall-Related Injury  Outcome: Progressing

## 2024-06-17 NOTE — DISCHARGE INSTRUCTIONS
Your problem is in your right ear.  If your right side has a problem:  Have a pillow towards the end of the bed where your feet most often are. Sit with your left side closest to the side of the bed. Sit far enough away from the pillow that it will end up being under your shoulders when you lie back. Tilt your head slightly back while doing this exercise. Be sure to hold each position for 30 seconds before moving to the next position.  Start sitting up with your legs extended in front of you. Turn your head long-term between looking straight forward and looking over your right shoulder. This is a 45 degree angle. Hold this position.  Lie back quickly with your head in the same position. Turn your head long-term to the right and tilted back a little bit. Hold this position.  Now roll your head to the left. Your head should now be tilted back a little bit. Turn your head to the left, looking long-term between straight ahead and over your left shoulder. This is a 45 degree angle. Hold this position.  Now, roll onto your left shoulder. Point your head down to the left at a 45 degree angle. Hold this position.  Finish by sitting up on the side of the bed. Do this 3 times throughout the day.

## 2024-06-17 NOTE — ASSESSMENT & PLAN NOTE
Appreciate neurology's evaluation  Symptoms appear more in the range of vertigo however for complete list will obtain MRI

## 2024-06-18 LAB
ALBUMIN SERPL BCP-MCNC: 3.5 G/DL (ref 3.5–5.2)
ALP SERPL-CCNC: 58 U/L (ref 55–135)
ALT SERPL W/O P-5'-P-CCNC: 13 U/L (ref 10–44)
ANION GAP SERPL CALC-SCNC: 9 MMOL/L (ref 8–16)
AST SERPL-CCNC: 15 U/L (ref 10–40)
BASOPHILS # BLD AUTO: 0.02 K/UL (ref 0–0.2)
BASOPHILS NFR BLD: 0.4 % (ref 0–1.9)
BILIRUB SERPL-MCNC: 0.4 MG/DL (ref 0.1–1)
BUN SERPL-MCNC: 24 MG/DL (ref 8–23)
CALCIUM SERPL-MCNC: 9 MG/DL (ref 8.7–10.5)
CHLORIDE SERPL-SCNC: 103 MMOL/L (ref 95–110)
CO2 SERPL-SCNC: 26 MMOL/L (ref 23–29)
CREAT SERPL-MCNC: 1 MG/DL (ref 0.5–1.4)
DIFFERENTIAL METHOD BLD: ABNORMAL
EOSINOPHIL # BLD AUTO: 0.1 K/UL (ref 0–0.5)
EOSINOPHIL NFR BLD: 2.3 % (ref 0–8)
ERYTHROCYTE [DISTWIDTH] IN BLOOD BY AUTOMATED COUNT: 14.1 % (ref 11.5–14.5)
EST. GFR  (NO RACE VARIABLE): >60 ML/MIN/1.73 M^2
GLUCOSE SERPL-MCNC: 95 MG/DL (ref 70–110)
HCT VFR BLD AUTO: 38.2 % (ref 40–54)
HGB BLD-MCNC: 12.5 G/DL (ref 14–18)
IMM GRANULOCYTES # BLD AUTO: 0.01 K/UL (ref 0–0.04)
IMM GRANULOCYTES NFR BLD AUTO: 0.2 % (ref 0–0.5)
LYMPHOCYTES # BLD AUTO: 1.9 K/UL (ref 1–4.8)
LYMPHOCYTES NFR BLD: 35.3 % (ref 18–48)
MCH RBC QN AUTO: 27.1 PG (ref 27–31)
MCHC RBC AUTO-ENTMCNC: 32.7 G/DL (ref 32–36)
MCV RBC AUTO: 83 FL (ref 82–98)
MONOCYTES # BLD AUTO: 0.6 K/UL (ref 0.3–1)
MONOCYTES NFR BLD: 11.3 % (ref 4–15)
NEUTROPHILS # BLD AUTO: 2.7 K/UL (ref 1.8–7.7)
NEUTROPHILS NFR BLD: 50.5 % (ref 38–73)
NRBC BLD-RTO: 0 /100 WBC
PLATELET # BLD AUTO: 232 K/UL (ref 150–450)
PMV BLD AUTO: 10.5 FL (ref 9.2–12.9)
POTASSIUM SERPL-SCNC: 4 MMOL/L (ref 3.5–5.1)
PROT SERPL-MCNC: 6.4 G/DL (ref 6–8.4)
RBC # BLD AUTO: 4.61 M/UL (ref 4.6–6.2)
SODIUM SERPL-SCNC: 138 MMOL/L (ref 136–145)
WBC # BLD AUTO: 5.33 K/UL (ref 3.9–12.7)

## 2024-06-18 PROCEDURE — 11000001 HC ACUTE MED/SURG PRIVATE ROOM

## 2024-06-18 PROCEDURE — 97530 THERAPEUTIC ACTIVITIES: CPT | Mod: CQ

## 2024-06-18 PROCEDURE — 97116 GAIT TRAINING THERAPY: CPT | Mod: CQ

## 2024-06-18 PROCEDURE — 25000003 PHARM REV CODE 250: Performed by: REGISTERED NURSE

## 2024-06-18 PROCEDURE — 85025 COMPLETE CBC W/AUTO DIFF WBC: CPT | Performed by: REGISTERED NURSE

## 2024-06-18 PROCEDURE — 94761 N-INVAS EAR/PLS OXIMETRY MLT: CPT

## 2024-06-18 PROCEDURE — 80053 COMPREHEN METABOLIC PANEL: CPT | Performed by: REGISTERED NURSE

## 2024-06-18 PROCEDURE — 99900035 HC TECH TIME PER 15 MIN (STAT)

## 2024-06-18 PROCEDURE — 36415 COLL VENOUS BLD VENIPUNCTURE: CPT | Performed by: REGISTERED NURSE

## 2024-06-18 PROCEDURE — 25000003 PHARM REV CODE 250: Performed by: NURSE PRACTITIONER

## 2024-06-18 RX ADMIN — ATORVASTATIN CALCIUM 80 MG: 40 TABLET, FILM COATED ORAL at 09:06

## 2024-06-18 RX ADMIN — POLYETHYLENE GLYCOL 3350 17 G: 17 POWDER, FOR SOLUTION ORAL at 09:06

## 2024-06-18 RX ADMIN — ACETAMINOPHEN 650 MG: 325 TABLET ORAL at 06:06

## 2024-06-18 RX ADMIN — LOSARTAN POTASSIUM 25 MG: 25 TABLET, FILM COATED ORAL at 09:06

## 2024-06-18 RX ADMIN — TAMSULOSIN HYDROCHLORIDE 0.4 MG: 0.4 CAPSULE ORAL at 09:06

## 2024-06-18 RX ADMIN — ASPIRIN 81 MG: 81 TABLET, COATED ORAL at 09:06

## 2024-06-18 NOTE — SUBJECTIVE & OBJECTIVE
Interval History: Patient resting in bed, complains of constipation, states dizziness from admission resolved, no other complaints voiced.     Review of Systems  Objective:     Vital Signs (Most Recent):  Temp: 98.5 °F (36.9 °C) (06/18/24 1209)  Pulse: 81 (06/18/24 1209)  Resp: 18 (06/18/24 1209)  BP: 126/66 (06/18/24 1209)  SpO2: 96 % (06/18/24 1209) Vital Signs (24h Range):  Temp:  [97.6 °F (36.4 °C)-98.5 °F (36.9 °C)] 98.5 °F (36.9 °C)  Pulse:  [67-85] 81  Resp:  [18-20] 18  SpO2:  [94 %-98 %] 96 %  BP: (110-126)/(62-69) 126/66     Weight: 80.3 kg (177 lb 0.5 oz)  Body mass index is 24.69 kg/m².    Intake/Output Summary (Last 24 hours) at 6/18/2024 1504  Last data filed at 6/18/2024 1212  Gross per 24 hour   Intake 450 ml   Output 1700 ml   Net -1250 ml         Physical Exam  Vitals reviewed.   Constitutional:       Appearance: Normal appearance. He is normal weight.   HENT:      Head: Normocephalic.      Mouth/Throat:      Mouth: Mucous membranes are moist.   Eyes:      Extraocular Movements: Extraocular movements intact.   Cardiovascular:      Rate and Rhythm: Normal rate and regular rhythm.      Pulses: Normal pulses.      Heart sounds: Normal heart sounds.   Pulmonary:      Effort: Pulmonary effort is normal.      Breath sounds: Normal breath sounds.   Abdominal:      General: Bowel sounds are normal.      Palpations: Abdomen is soft.   Genitourinary:     Comments: Deferred, pt voids   Musculoskeletal:         General: Normal range of motion.      Cervical back: Normal range of motion and neck supple.   Skin:     General: Skin is warm and dry.   Neurological:      General: No focal deficit present.      Mental Status: He is alert and oriented to person, place, and time.             Significant Labs: All pertinent labs within the past 24 hours have been reviewed.  CBC:   Recent Labs   Lab 06/17/24  0246 06/18/24  0239   WBC 4.20 5.33   HGB 12.7* 12.5*   HCT 39.1* 38.2*    232     CMP:   Recent Labs    Lab 06/17/24  0246 06/18/24  0239    138   K 4.1 4.0    103   CO2 23 26   GLU 87 95   BUN 26* 24*   CREATININE 1.0 1.0   CALCIUM 9.0 9.0   PROT 6.6 6.4   ALBUMIN 3.6 3.5   BILITOT 0.5 0.4   ALKPHOS 59 58   AST 17 15   ALT 11 13   ANIONGAP 9 9       Significant Imaging: I have reviewed all pertinent imaging results/findings within the past 24 hours.    MRI BRAIN WITHOUT CONTRAST  Impression:    No acute intracranial abnormality     Remote cortical and subcortical infarcts, atrophy, and periventricular white matter changes as described.     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

## 2024-06-18 NOTE — ASSESSMENT & PLAN NOTE
Appreciate neurology's evaluation  Symptoms appear more in the range of vertigo however for complete list will obtain MRI    6/18 pt reports no dizziness after  Epley maneuver with neurology

## 2024-06-18 NOTE — PHARMACY MED REC
Medication reconciliation performed, no discrepancies noted between home medication list and admit orders.        .

## 2024-06-18 NOTE — ASSESSMENT & PLAN NOTE
Patient presented with dizziness outside of window for thrombolytic  CT head with old infarcts which were unknown to patient  CTA head and neck without LVO or stenosis    MRI brain still pending.   echo ordered   consult neurology  Start aspirin and high-intensity statin    6/1 MRI brain -No acute intracranial abnormality will continue ASA, atorvastatin 80mg, follow-up with vascular Neurology, awaiting final PT/OT recs

## 2024-06-18 NOTE — PROGRESS NOTES
"[facility] Medicine  Progress Note    Patient Name: Jack Soria  MRN: [id number]  Patient Class: IP- Inpatient   Admission Date: 6/16/2024  Length of Stay: 2 days  Attending Physician: Austin Eugene MD  Primary Care Provider: No primary care provider on file.        Subjective:     Principal Problem:Stroke-like symptoms        HPI:  Jack Soria is a 78yo M with a PMH of HTN, OA, and BPH who presented to the [facility] ED on 6/15/2024 with complaints of dizziness. Per referring NP: "woke two days ago with sinus and ear pressure. Upon noticing symptoms he began "shaking my head a lot" and since then has dizziness which is positional, worse from reclined position to siting or standing. He has several days where" I am getting a quart of mucus out my head a day", cough and worsening dizziness. Dizziness symptoms began approximately 3 days ago, however could be as a 4 out as a week. He has no previous history of CVA."     In the ED, vitals were stable: /71 though otherwise unremarkable. Labs grossly unrevealing. 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 Telestroke consult was placed; pt out of the tPN window. Dr. Estrada recommended transfer for MRI and neurology evaluation.        Overview/Hospital Course:  6/18 Neurology consulted and following, old CVA results noted, pt to complete stroke workup with TTE/bubble study, add low-dose ASA, follow-up MRI results if no other acute DWI infarcts can continue on ASA; if other infarcts seen will need ASA &Plavix x21 days, increase atorvastatin to 80 mg a day. MRI Brain completed, no acute intracranial abnormality, will continue ASA, recc vascular neurology f/up.   - Pt reports dizziness has resolved, awaiting PT/OT recc.s, no SLP needs  - TTE with normal LVEF 66%, bubble study positive for intracardiac shunt, left " atrial septal aneurysm found.  Cardiology consulted, no indication at this time for PFO closure continue ASA, statin and BP management may follow-up with Cardiology as an outpatient.       Interval History: Patient resting in bed, complains of constipation, states dizziness from admission resolved, no other complaints voiced.     Review of Systems  Objective:     Vital Signs (Most Recent):  Temp: 98.5 °F (36.9 °C) (06/18/24 1209)  Pulse: 81 (06/18/24 1209)  Resp: 18 (06/18/24 1209)  BP: 126/66 (06/18/24 1209)  SpO2: 96 % (06/18/24 1209) Vital Signs (24h Range):  Temp:  [97.6 °F (36.4 °C)-98.5 °F (36.9 °C)] 98.5 °F (36.9 °C)  Pulse:  [67-85] 81  Resp:  [18-20] 18  SpO2:  [94 %-98 %] 96 %  BP: (110-126)/(62-69) 126/66     Weight: 80.3 kg (177 lb 0.5 oz)  Body mass index is 24.69 kg/m².    Intake/Output Summary (Last 24 hours) at 6/18/2024 1504  Last data filed at 6/18/2024 1212  Gross per 24 hour   Intake 450 ml   Output 1700 ml   Net -1250 ml         Physical Exam  Vitals reviewed.   Constitutional:       Appearance: Normal appearance. He is normal weight.   HENT:      Head: Normocephalic.      Mouth/Throat:      Mouth: Mucous membranes are moist.   Eyes:      Extraocular Movements: Extraocular movements intact.   Cardiovascular:      Rate and Rhythm: Normal rate and regular rhythm.      Pulses: Normal pulses.      Heart sounds: Normal heart sounds.   Pulmonary:      Effort: Pulmonary effort is normal.      Breath sounds: Normal breath sounds.   Abdominal:      General: Bowel sounds are normal.      Palpations: Abdomen is soft.   Genitourinary:     Comments: Deferred, pt voids   Musculoskeletal:         General: Normal range of motion.      Cervical back: Normal range of motion and neck supple.   Skin:     General: Skin is warm and dry.   Neurological:      General: No focal deficit present.      Mental Status: He is alert and oriented to person, place, and time.             Significant Labs: All pertinent labs  within the past 24 hours have been reviewed.  CBC:   Recent Labs   Lab 06/17/24  0246 06/18/24  0239   WBC 4.20 5.33   HGB 12.7* 12.5*   HCT 39.1* 38.2*    232     CMP:   Recent Labs   Lab 06/17/24  0246 06/18/24  0239    138   K 4.1 4.0    103   CO2 23 26   GLU 87 95   BUN 26* 24*   CREATININE 1.0 1.0   CALCIUM 9.0 9.0   PROT 6.6 6.4   ALBUMIN 3.6 3.5   BILITOT 0.5 0.4   ALKPHOS 59 58   AST 17 15   ALT 11 13   ANIONGAP 9 9       Significant Imaging: I have reviewed all pertinent imaging results/findings within the past 24 hours.    MRI BRAIN WITHOUT CONTRAST  Impression:    No acute intracranial abnormality     Remote cortical and subcortical infarcts, atrophy, and periventricular white matter changes as described.     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      Assessment/Plan:      * Stroke-like symptoms  Patient presented with dizziness outside of window for thrombolytic  CT head with old infarcts which were unknown to patient  CTA head and neck without LVO or stenosis    MRI brain still pending.   echo ordered   consult neurology  Start aspirin and high-intensity statin    6/1 MRI brain -No acute intracranial abnormality will continue ASA, atorvastatin 80mg, follow-up with vascular Neurology, awaiting final PT/OT recs    Interatrial septal aneurysm with PFO        Vertigo  Appreciate neurology's evaluation  Symptoms appear more in the range of vertigo however for complete list will obtain MRI    6/18 pt reports no dizziness after  Epley maneuver with neurology     Benign prostatic hyperplasia with urinary hesitancy  Resume Flomax        VTE Risk Mitigation (From admission, onward)           Ordered     Reason for No Pharmacological VTE Prophylaxis  Once        Question:  Reasons:  Answer:  Patient is Ambulatory    06/16/24 0600     IP VTE HIGH RISK PATIENT  Once         06/16/24 0600     Place sequential compression device  Until discontinued         06/16/24 0600                     Discharge Planning   SOPHIA:      Code Status: Full Code   Is the patient medically ready for discharge?:     Reason for patient still in hospital (select all that apply): Consult recommendations and PT / OT recommendations  Discharge Plan A: Home, Home with family                  Elvia Gallegos NP  Department of [facility] Medicine   [facility]

## 2024-06-18 NOTE — HOSPITAL COURSE
6/18 Neurology consulted and following, old CVA results noted, pt to complete stroke workup with TTE/bubble study, add low-dose ASA, follow-up MRI results if no other acute DWI infarcts can continue on ASA; if other infarcts seen will need ASA &Plavix x21 days, increase atorvastatin to 80 mg a day. MRI Brain completed, no acute intracranial abnormality, will continue ASA, recc vascular neurology f/up.   - Pt reports dizziness has resolved, awaiting PT/OT recc.s, no SLP needs  - TTE with normal LVEF 66%, bubble study positive for intracardiac shunt, left atrial septal aneurysm found.  Cardiology consulted, no indication at this time for PFO closure continue ASA, statin and BP management may follow-up with Cardiology as an outpatient.     6/19 Pt complains dizzines, vertigo, feeling as if the room was spinning intemittently, nursing reports dizziness overnight. Will add meclizine, PT notified of complaints, performed [place]-Hallpike to R and L; affected side R; performed Epley maneuver to R; incurred nystagmus/c/o dizziness (room spinning during the maneuver; once complete, no c/o dizziness. ENT consult ordered. Will observe overnight, plan for d/c in am.     6/20 Pt repeated epley maneuver, meclizine change to scheduled dosing. Outpatient ENT follow-up scheduled. Pt referral to [facility] for vertigo treatment, he is to follow-up with neurovascular as well, has appointment with PCP scheduled for next week.     Patient is stable for discharge, medications follow-ups reviewed.  Order placed for straight cane per PT recommendations.

## 2024-06-18 NOTE — PLAN OF CARE
Rounded on pt. Waiting on MRI before discharge. Plan is discharge to home with family after MRI ordered by Neurology. SOPHIA tomorrow with RW per PT/OT recommendations and HH for Nurse and  Therapy. Referrals sent. Pt will need hospital transportation when dc. F/u appointments made and added for AVS.    06/18/24 1057   Rounds   Attendance Provider;Nurse    Discharge Plan A Home;Home with family   Why the patient remains in the hospital Requires continued medical care   Transition of Care Barriers None

## 2024-06-18 NOTE — PT/OT/SLP PROGRESS
Physical Therapy Treatment    Patient Name:  Jack Soria   MRN:  [id number]    Recommendations:     Discharge Recommendations: Low Intensity Therapy  Discharge Equipment Recommendations: walker, rolling  Barriers to discharge: Decreased caregiver support    Assessment:     Jack Soria is a 79 y.o. male admitted with a medical diagnosis of Stroke-like symptoms.  He presents with the following impairments/functional limitations: weakness, impaired endurance, impaired self care skills, impaired functional mobility, gait instability, impaired cardiopulmonary response to activity, decreased safety awareness Pt would continue to benefit from P.T. To address impairments listed above.  .    Rehab Prognosis: Good; patient would benefit from acute skilled PT services to address these deficits and reach maximum level of function.    Recent Surgery: * No surgery found *      Plan:     During this hospitalization, patient to be seen 4 x/week to address the identified rehab impairments via gait training, therapeutic activities, therapeutic exercises, neuromuscular re-education and progress toward the following goals:    Plan of Care Expires:  07/07/24    Subjective     Patient/Family Comments/goals: Pt agreed to tx.  Pain/Comfort:  Pain Rating 1: 0/10  Pain Rating Post-Intervention 1: 0/10      Objective:     Communicated with RN prior to session.  Patient found HOB elevated with bed alarm, telemetry upon PT entry to room.     General Precautions: Standard, fall  Orthopedic Precautions:    Braces:    Respiratory Status: Room air     Functional Mobility:  Bed Mobility:     Rolling Left:  independence  Scooting: independence and to EOB  Supine to Sit: independence  Sit to Supine: independence  Transfers:     Sit to Stand:  supervision with rolling walker  Gait: 125ft with RW and S.  100ft without A.D. and CGA/SBA.  VC's for increased awareness of R foot clearance of floor.  No LOB  Balance: Sitting good, standing  fair+/good-, gait fair/fair+      AM-PAC 6 CLICK MOBILITY  Turning over in bed (including adjusting bedclothes, sheets and blankets)?: 4  Sitting down on and standing up from a chair with arms (e.g., wheelchair, bedside commode, etc.): 3  Moving from lying on back to sitting on the side of the bed?: 4  Moving to and from a bed to a chair (including a wheelchair)?: 3  Need to walk in hospital room?: 3  Climbing 3-5 steps with a railing?: 3  Basic Mobility Total Score: 20       Treatment & Education:  Pt sat EOB and performed BLE therex: AP, hip flexion, and LAQs 12 x 2 reps with assistance on R for increased knee extension.  Pt reports increased weakness from a prior sx over 20 years ago for ligament injury.  Gait training as above with a trial of ambulating without RW secondary to pt not using one prior to admission.  No LOB with or without RW use.  Continue with trials of gait with and without A.D. to continued to work on balance. Pt declined sitting up in b/s chair at this time.    Patient left HOB elevated with all lines intact, call button in reach, bed alarm on, and Rn notified..    GOALS:   Multidisciplinary Problems       Physical Therapy Goals          Problem: Physical Therapy    Goal Priority Disciplines Outcome Goal Variances Interventions   Physical Therapy Goal     PT, PT/OT Progressing     Description: Goals to be met by: 2024     Patient will increase functional independence with mobility by performin. Supine to sit with Modified [place]  2. Sit to supine with Modified [place]  4. Rolling with Modified [place].  5. Sit to stand transfer Mod I using Rolling Walker  6. Bed to chair transfer with Mod I Rolling Walker  7. Gait  2 x 150 feet Mod I using Rolling Walker.   8. Ascend/descend 4 stair with bilateral Handrails Stand-by Assistance   9. Lower extremity exercise program  2 x10 reps with supervision                          Time Tracking:     PT Received On: 24  PT  Start Time: 1140     PT Stop Time: 1204  PT Total Time (min): 24 min     Billable Minutes: Gait Training 12 and Therapeutic Activity 12    Treatment Type: Treatment  PT/PTA: PTA     Number of PTA visits since last PT visit: 2     06/18/2024

## 2024-06-19 LAB
ESTIMATED AVG GLUCOSE: 114 MG/DL (ref 68–131)
HBA1C MFR BLD: 5.6 % (ref 4–5.6)

## 2024-06-19 PROCEDURE — 99900035 HC TECH TIME PER 15 MIN (STAT)

## 2024-06-19 PROCEDURE — 25000003 PHARM REV CODE 250: Performed by: NURSE PRACTITIONER

## 2024-06-19 PROCEDURE — 25000003 PHARM REV CODE 250: Performed by: REGISTERED NURSE

## 2024-06-19 PROCEDURE — 97535 SELF CARE MNGMENT TRAINING: CPT | Mod: CO

## 2024-06-19 PROCEDURE — 97530 THERAPEUTIC ACTIVITIES: CPT

## 2024-06-19 PROCEDURE — 94761 N-INVAS EAR/PLS OXIMETRY MLT: CPT

## 2024-06-19 PROCEDURE — 11000001 HC ACUTE MED/SURG PRIVATE ROOM

## 2024-06-19 PROCEDURE — 36415 COLL VENOUS BLD VENIPUNCTURE: CPT | Performed by: NURSE PRACTITIONER

## 2024-06-19 PROCEDURE — 97535 SELF CARE MNGMENT TRAINING: CPT

## 2024-06-19 PROCEDURE — 95992 CANALITH REPOSITIONING PROC: CPT

## 2024-06-19 PROCEDURE — 97116 GAIT TRAINING THERAPY: CPT

## 2024-06-19 PROCEDURE — 83036 HEMOGLOBIN GLYCOSYLATED A1C: CPT | Performed by: NURSE PRACTITIONER

## 2024-06-19 RX ORDER — MECLIZINE HYDROCHLORIDE 25 MG/1
25 TABLET ORAL 3 TIMES DAILY PRN
Status: DISCONTINUED | OUTPATIENT
Start: 2024-06-19 | End: 2024-06-20

## 2024-06-19 RX ORDER — MECLIZINE HYDROCHLORIDE 25 MG/1
25 TABLET ORAL 3 TIMES DAILY PRN
Qty: 30 TABLET | Refills: 0 | Status: CANCELLED | OUTPATIENT
Start: 2024-06-19

## 2024-06-19 RX ORDER — LIDOCAINE 50 MG/G
1 PATCH TOPICAL
Status: DISCONTINUED | OUTPATIENT
Start: 2024-06-19 | End: 2024-06-21 | Stop reason: HOSPADM

## 2024-06-19 RX ORDER — BISACODYL 5 MG
5 TABLET, DELAYED RELEASE (ENTERIC COATED) ORAL DAILY
Status: DISCONTINUED | OUTPATIENT
Start: 2024-06-19 | End: 2024-06-21 | Stop reason: HOSPADM

## 2024-06-19 RX ADMIN — POLYETHYLENE GLYCOL 3350 17 G: 17 POWDER, FOR SOLUTION ORAL at 09:06

## 2024-06-19 RX ADMIN — ASPIRIN 81 MG: 81 TABLET, COATED ORAL at 09:06

## 2024-06-19 RX ADMIN — ATORVASTATIN CALCIUM 80 MG: 40 TABLET, FILM COATED ORAL at 09:06

## 2024-06-19 RX ADMIN — LOSARTAN POTASSIUM 25 MG: 25 TABLET, FILM COATED ORAL at 09:06

## 2024-06-19 RX ADMIN — BISACODYL 5 MG: 5 TABLET, COATED ORAL at 02:06

## 2024-06-19 RX ADMIN — TAMSULOSIN HYDROCHLORIDE 0.4 MG: 0.4 CAPSULE ORAL at 09:06

## 2024-06-19 NOTE — PLAN OF CARE
Problem: Occupational Therapy  Goal: Occupational Therapy Goal  Description: Goals to be met by: 07/16/24     Patient will increase functional independence with ADLs by performing:    UE Dressing with Modified [place].  LE Dressing with Modified [place].  Grooming while standing at sink with Modified [place].  Toileting from toilet with Modified [place] for hygiene and clothing management.   Toilet transfer to toilet with Modified [place].    Outcome: Progressing   Jack Soria is a 79 y.o. male with a medical diagnosis of Stroke-like symptoms.   Performance deficits affecting function are weakness, impaired endurance, impaired self care skills, impaired functional mobility, gait instability, impaired balance, decreased safety awareness.    Pt found in bed, agreeable to therapy.  Pt is performing BADL's at Supervision to Mod I level.  He continues with complaints of dizziness and unsteadiness with positional changes especially when turning head possibly due to vertigo. PT notified.  Continue OT services to address functional goals, progressing as able.

## 2024-06-19 NOTE — PLAN OF CARE
Patient presented with dizziness outside of window for thrombolytic, CT head with old infarcts, CTA head and neck without LVO or stenosis, aspirin and high-intensity statin started, 6/16 MRI brain -No acute intracranial abnormality will continue ASA, atorvastatin 80mg, follow-up with vascular Neurology, 6/18 pt reports no dizziness after  Epley maneuver with neurology, pt remained HDS

## 2024-06-19 NOTE — PROGRESS NOTES
"[facility] Medicine  Progress Note    Patient Name: Jack Soria  MRN: [id number]  Patient Class: IP- Inpatient   Admission Date: 6/16/2024  Length of Stay: 3 days  Attending Physician: Austin Eugene MD  Primary Care Provider: No primary care provider on file.        Subjective:     Principal Problem:Stroke-like symptoms        HPI:  Jack Soria is a 78yo M with a PMH of HTN, OA, and BPH who presented to the [facility] ED on 6/15/2024 with complaints of dizziness. Per referring NP: "woke two days ago with sinus and ear pressure. Upon noticing symptoms he began "shaking my head a lot" and since then has dizziness which is positional, worse from reclined position to siting or standing. He has several days where" I am getting a quart of mucus out my head a day", cough and worsening dizziness. Dizziness symptoms began approximately 3 days ago, however could be as a 4 out as a week. He has no previous history of CVA."     In the ED, vitals were stable: /71 though otherwise unremarkable. Labs grossly unrevealing. 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 Telestroke consult was placed; pt out of the tPN window. Dr. Estrada recommended transfer for MRI and neurology evaluation.        Overview/Hospital Course:  6/18 Neurology consulted and following, old CVA results noted, pt to complete stroke workup with TTE/bubble study, add low-dose ASA, follow-up MRI results if no other acute DWI infarcts can continue on ASA; if other infarcts seen will need ASA &Plavix x21 days, increase atorvastatin to 80 mg a day. MRI Brain completed, no acute intracranial abnormality, will continue ASA, recc vascular neurology f/up.   - Pt reports dizziness has resolved, awaiting PT/OT recc.s, no SLP needs  - TTE with normal LVEF 66%, bubble study positive for intracardiac shunt, left " atrial septal aneurysm found.  Cardiology consulted, no indication at this time for PFO closure continue ASA, statin and BP management may follow-up with Cardiology as an outpatient.     6/19 Pt complains dizzines, vertigo, feeling as if the room was spinning intemittently, nursing reports dizziness overnight. Will add meclizine, PT notified of complaints, performed [place]-Hallpike to R and L; affected side R; performed Epley maneuver to R; incurred nystagmus/c/o dizziness (room spinning during the maneuver; once complete, no c/o dizziness. ENT consult ordered. Will observe overnight, plan for d/c in am.       Interval History: Pt awake and alert reports R sided vertigo, sinus pressure, constipation. No abd pain, n/v.     Review of Systems   HENT:  Positive for rhinorrhea and sinus pressure.    Gastrointestinal:  Positive for constipation. Negative for abdominal distention and rectal pain.   Genitourinary:  Negative for dysuria.   Neurological:  Positive for dizziness.     Objective:     Vital Signs (Most Recent):  Temp: 98.3 °F (36.8 °C) (06/19/24 1626)  Pulse: 86 (06/19/24 1626)  Resp: 18 (06/19/24 1626)  BP: (!) 103/59 (06/19/24 1626)  SpO2: 97 % (06/19/24 1626) Vital Signs (24h Range):  Temp:  [97.6 °F (36.4 °C)-98.4 °F (36.9 °C)] 98.3 °F (36.8 °C)  Pulse:  [66-86] 86  Resp:  [16-20] 18  SpO2:  [95 %-98 %] 97 %  BP: (103-118)/(59-64) 103/59     Weight: 80.3 kg (177 lb 0.5 oz)  Body mass index is 24.69 kg/m².    Intake/Output Summary (Last 24 hours) at 6/19/2024 1802  Last data filed at 6/19/2024 1727  Gross per 24 hour   Intake 665 ml   Output 1050 ml   Net -385 ml         Physical Exam  Vitals reviewed.   Constitutional:       Appearance: Normal appearance. He is normal weight.   HENT:      Head: Normocephalic.      Mouth/Throat:      Mouth: Mucous membranes are moist.   Eyes:      Extraocular Movements: Extraocular movements intact.   Neck:      Comments: R neck stiffness   Cardiovascular:      Rate and Rhythm:  Normal rate and regular rhythm.      Pulses: Normal pulses.      Heart sounds: Normal heart sounds.   Pulmonary:      Effort: Pulmonary effort is normal.      Breath sounds: Normal breath sounds.   Abdominal:      General: Bowel sounds are normal.      Palpations: Abdomen is soft.      Tenderness: There is no abdominal tenderness.   Genitourinary:     Comments: Deferred, pt voids   Musculoskeletal:         General: Normal range of motion.   Skin:     General: Skin is warm and dry.   Neurological:      Mental Status: He is alert and oriented to person, place, and time.   Psychiatric:         Mood and Affect: Mood normal.             Significant Labs: All pertinent labs within the past 24 hours have been reviewed.  A1C:   Recent Labs   Lab 06/19/24  0322   HGBA1C 5.6     CBC:   Recent Labs   Lab 06/18/24  0239   WBC 5.33   HGB 12.5*   HCT 38.2*        CMP:   Recent Labs   Lab 06/18/24  0239      K 4.0      CO2 26   GLU 95   BUN 24*   CREATININE 1.0   CALCIUM 9.0   PROT 6.4   ALBUMIN 3.5   BILITOT 0.4   ALKPHOS 58   AST 15   ALT 13   ANIONGAP 9       Significant Imaging: I have reviewed all pertinent imaging results/findings within the past 24 hours.      Assessment/Plan:      * Stroke-like symptoms  Patient presented with dizziness outside of window for thrombolytic  CT head with old infarcts which were unknown to patient  CTA head and neck without LVO or stenosis    MRI brain still pending.   echo ordered   consult neurology  Start aspirin and high-intensity statin    6/18 MRI brain -No acute intracranial abnormality will continue ASA, atorvastatin 80mg, follow-up with vascular Neurology, awaiting final PT/OT recs  - Echo with atrial septal aneurysm, cardiology consulted 6/17 - no indication at this time for PFO closure, continue ASA, statin and BP management follow-up with cardiology outpatient            Interatrial septal aneurysm with PFO        Vertigo  Appreciate neurology's  evaluation  Symptoms appear more in the range of vertigo however for complete list will obtain MRI    6/18 pt reports no dizziness after  Epley maneuver with neurology     6.19 pt complains of dizziness, vertigo intermittently overnight and this am   - will add meclizine  - spoke with Neuro recc PT for vertigo maneuvers   - PT performed [place]-Hallpike to R and L; affected side R; performed Epley maneuver to R; incurred nystagmus/c/o dizziness (room spinning during the maneuver; once complete, no c/o dizziness   - recommend standard cane for safety, ENT follow up, OP vestibular rehab (to coincide with ENT recs) to include cervical rehab as patient c/o neck tightness/stiffness-pt also demonstrates decreased ROM in B shoulders R>L.    Benign prostatic hyperplasia with urinary hesitancy  Resume Flomax        VTE Risk Mitigation (From admission, onward)           Ordered     Reason for No Pharmacological VTE Prophylaxis  Once        Question:  Reasons:  Answer:  Patient is Ambulatory    06/16/24 0600     IP VTE HIGH RISK PATIENT  Once         06/16/24 0600     Place sequential compression device  Until discontinued         06/16/24 0600                    Discharge Planning   SOPHIA:      Code Status: Full Code   Is the patient medically ready for discharge?:     Reason for patient still in hospital (select all that apply): Patient trending condition, Consult recommendations, and PT / OT recommendations  Discharge Plan A: Home, Home with family, Home Health (Nursing Care )                  Elvia Gallegos NP  Department of Hospital Medicine   [facility]

## 2024-06-19 NOTE — SUBJECTIVE & OBJECTIVE
Interval History: Pt awake and alert reports R sided vertigo, sinus pressure, constipation. No abd pain, n/v.     Review of Systems   HENT:  Positive for rhinorrhea and sinus pressure.    Gastrointestinal:  Positive for constipation. Negative for abdominal distention and rectal pain.   Genitourinary:  Negative for dysuria.   Neurological:  Positive for dizziness.     Objective:     Vital Signs (Most Recent):  Temp: 98.3 °F (36.8 °C) (06/19/24 1626)  Pulse: 86 (06/19/24 1626)  Resp: 18 (06/19/24 1626)  BP: (!) 103/59 (06/19/24 1626)  SpO2: 97 % (06/19/24 1626) Vital Signs (24h Range):  Temp:  [97.6 °F (36.4 °C)-98.4 °F (36.9 °C)] 98.3 °F (36.8 °C)  Pulse:  [66-86] 86  Resp:  [16-20] 18  SpO2:  [95 %-98 %] 97 %  BP: (103-118)/(59-64) 103/59     Weight: 80.3 kg (177 lb 0.5 oz)  Body mass index is 24.69 kg/m².    Intake/Output Summary (Last 24 hours) at 6/19/2024 1802  Last data filed at 6/19/2024 1727  Gross per 24 hour   Intake 665 ml   Output 1050 ml   Net -385 ml         Physical Exam  Vitals reviewed.   Constitutional:       Appearance: Normal appearance. He is normal weight.   HENT:      Head: Normocephalic.      Mouth/Throat:      Mouth: Mucous membranes are moist.   Eyes:      Extraocular Movements: Extraocular movements intact.   Neck:      Comments: R neck stiffness   Cardiovascular:      Rate and Rhythm: Normal rate and regular rhythm.      Pulses: Normal pulses.      Heart sounds: Normal heart sounds.   Pulmonary:      Effort: Pulmonary effort is normal.      Breath sounds: Normal breath sounds.   Abdominal:      General: Bowel sounds are normal.      Palpations: Abdomen is soft.      Tenderness: There is no abdominal tenderness.   Genitourinary:     Comments: Deferred, pt voids   Musculoskeletal:         General: Normal range of motion.   Skin:     General: Skin is warm and dry.   Neurological:      Mental Status: He is alert and oriented to person, place, and time.   Psychiatric:         Mood and Affect:  Mood normal.             Significant Labs: All pertinent labs within the past 24 hours have been reviewed.  A1C:   Recent Labs   Lab 06/19/24  0322   HGBA1C 5.6     CBC:   Recent Labs   Lab 06/18/24  0239   WBC 5.33   HGB 12.5*   HCT 38.2*        CMP:   Recent Labs   Lab 06/18/24  0239      K 4.0      CO2 26   GLU 95   BUN 24*   CREATININE 1.0   CALCIUM 9.0   PROT 6.4   ALBUMIN 3.5   BILITOT 0.4   ALKPHOS 58   AST 15   ALT 13   ANIONGAP 9       Significant Imaging: I have reviewed all pertinent imaging results/findings within the past 24 hours.

## 2024-06-19 NOTE — ASSESSMENT & PLAN NOTE
Patient presented with dizziness outside of window for thrombolytic  CT head with old infarcts which were unknown to patient  CTA head and neck without LVO or stenosis    MRI brain still pending.   echo ordered   consult neurology  Start aspirin and high-intensity statin    6/18 MRI brain -No acute intracranial abnormality will continue ASA, atorvastatin 80mg, follow-up with vascular Neurology, awaiting final PT/OT recs  - Echo with atrial septal aneurysm, cardiology consulted 6/17 - no indication at this time for PFO closure, continue ASA, statin and BP management follow-up with cardiology outpatient

## 2024-06-19 NOTE — PLAN OF CARE
Problem: Physical Therapy  Goal: Physical Therapy Goal  Description: Goals to be met by: 2024     Patient will increase functional independence with mobility by performin. Supine to sit with Modified [place]-  2. Sit to supine with Modified [place]-met   4. Rolling with Modified [place].-met   5. Sit to stand transfer Mod I using Rolling Walker  Revised: Nik with LRAD  6. Bed to chair transfer with Mod I Rolling Walker  Revised: Nik with LRAD  7. Gait  2 x 150 feet Mod I using Rolling Walker.   Revised: Nik with LRAD  8. Ascend/descend 4 stair with bilateral Handrails Stand-by Assistance   Revised: Nik with LRAD  9. Lower extremity exercise program  2 x10 reps with supervision     Outcome: Progressing   During first session, pt experienced onset of dizziness when rolling over and in supine position; performed [place]-Hallpike to R and L; affected side R; performed Epley maneuver to R; incurred nystagmus/c/o dizziness (room spinning during the maneuver; once complete, no c/o dizziness; educated pt on movements/positions to avoid and amb in room with supervision and no AD; pt used the bathroom and returned to bed but bed was flat at the time and before HOB could be elevated, pt laid flat and the dizziness returned but quickly dissipated after the HOB was elevated; on second session, re-educated patient on movements/positions to avoid and given handout including self epley maneuver; instructed pt on use of cane and patient amb in moreno~150ft using SPC to use in case dizziness returned at some point; pt did forget and moved his head quickly to the R to answer a nurse but did not incur any dizziness at that time; recommend standard cane for safety, ENT follow up, OP vestibular rehab (to coincide with ENT recs) to include cervical rehab as patient c/o neck tightness/stiffness-pt also demonstrates decreased ROM in B shoulders R>L.

## 2024-06-19 NOTE — PLAN OF CARE
Rounded at bedside. Continue to c/o dizziness/vertigo with movements. When medically ready for discharge, plans are to discharge to home with HH services with [facility] for Nurse, PT/OT. Pt will need hospital transportation to home. 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 Elvia NP will put in order for ambulatory referral with ENT and to Vestibular clinic for dizziness. PT at bedside and will perform Epley maneuver.   06/19/24 1017   Rounds   Attendance Provider;Nurse    Discharge Plan A Home;Home with family;Home Health  ([facility])   Why the patient remains in the hospital Requires continued medical care   Transition of Care Barriers Transportation

## 2024-06-19 NOTE — PLAN OF CARE
[facility]      HOME HEALTH ORDERS  FACE TO FACE ENCOUNTER    Patient Name: Jack Soria  YOB: 1945    PCP: No primary care provider on file.   PCP Address: No primary physician on file.  PCP Phone Number: None  PCP Fax: None    Encounter Date: 6/15/24    Admit to Home Health    Diagnoses:  Active Hospital Problems    Diagnosis  POA    *Stroke-like symptoms [R29.90]  Yes    Vertigo [R42]  Yes    Interatrial septal aneurysm with PFO [Q21.12, I25.3]  Not Applicable    Cerebrovascular accident (CVA) [I63.9]  Yes    Benign prostatic hyperplasia with urinary hesitancy [N40.1, R39.11]  Yes     Formatting of this note might be different from the original.   Added automatically from request for surgery [id number]        Resolved Hospital Problems   No resolved problems to display.       Follow Up Appointments:  No future appointments.    Allergies:Review of patient's allergies indicates:  No Known Allergies    Medications: Review discharge medications with patient and family and provide education.    Current Facility-Administered Medications   Medication Dose Route Frequency Provider Last Rate Last Admin    acetaminophen tablet 650 mg  650 mg Oral Q6H PRN Ethan Lauren NP   650 mg at 06/18/24 0622    aspirin EC tablet 81 mg  81 mg Oral Daily Ethan Lauren NP   81 mg at 06/19/24 0918    atorvastatin tablet 80 mg  80 mg Oral Daily Michael Smith, NP   80 mg at 06/19/24 0918    bisacodyL EC tablet 5 mg  5 mg Oral Daily Elvia Gallegos NP        bisacodyL suppository 10 mg  10 mg Rectal Daily PRN Ethan Lauren NP   10 mg at 06/17/24 1853    LIDOcaine 5 % patch 1 patch  1 patch Transdermal Q24H Mina Hillman MD        losartan tablet 25 mg  25 mg Oral Daily Ethan Lauren, NP   25 mg at 06/19/24 0919    meclizine tablet 25 mg  25 mg Oral TID PRN Elvia Gallegos NP        ondansetron injection 4 mg  4 mg Intravenous Q12H PRN Ethan Lauren  NANCY, NP        polyethylene glycol packet 17 g  17 g Oral Daily Michael Smith NP   17 g at 06/19/24 0919    sodium chloride 0.9% bolus 500 mL 500 mL  500 mL Intravenous PRN Ethan Lauren, NP        sodium chloride 0.9% flush 10 mL  10 mL Intravenous PRN Ethan Lauren, NP        tamsulosin 24 hr capsule 0.4 mg  0.4 mg Oral Daily Ethan Lauren NP   0.4 mg at 06/19/24 0918     Current Discharge Medication List        CONTINUE these medications which have NOT CHANGED    Details   losartan (COZAAR) 25 MG tablet Take 1 tablet (25 mg total) by mouth once daily.  Qty: 30 tablet, Refills: 5    Comments: .  Associated Diagnoses: Essential hypertension      meloxicam (MOBIC) 7.5 MG tablet Take 7.5 mg by mouth once daily.      tamsulosin (FLOMAX) 0.4 mg Cap TAKE 1 CAPSULE BY MOUTH DAILY AT BEDTIME  Qty: 30 capsule, Refills: 6    Associated Diagnoses: Benign prostatic hyperplasia with urinary hesitancy               I have seen and examined this patient within the last 30 days. My clinical findings that support the need for the home health skilled services and home bound status are the following:no   Weakness/numbness causing balance and gait disturbance due to paroxysmal vertigo making it taxing to leave home.     Diet:   regular diet and cardiac diet    Labs:N/A    Referrals/ Consults  Physical Therapy to evaluate and treat. Evaluate for home safety and equipment needs; Establish/upgrade home exercise program. Perform / instruct on therapeutic exercises, gait training, transfer training, and Range of Motion.  Occupational Therapy to evaluate and treat. Evaluate home environment for safety and equipment needs. Perform/Instruct on transfers, ADL training, ROM, and therapeutic exercises.    Activities:   activity as tolerated    Nursing:   Agency to admit patient within 24 hours of hospital discharge unless specified on physician order or at patient request    SN to complete comprehensive  assessment including routine vital signs. Instruct on disease process and s/s of complications to report to MD. Review/verify medication list sent home with the patient at time of discharge  and instruct patient/caregiver as needed. Frequency may be adjusted depending on start of care date.     Skilled nurse to perform up to 3 visits PRN for symptoms related to diagnosis    Notify MD if SBP > 160 or < 90; DBP > 90 or < 50; HR > 120 or < 50; Temp > 101; O2 < 88%; Other:   dizziness, weakness    Ok to schedule additional visits based on staff availability and patient request on consecutive days within the home health episode.    When multiple disciplines ordered:    Start of Care occurs on Sunday - Wednesday schedule remaining discipline evaluations as ordered on separate consecutive days following the start of care.    Thursday SOC -schedule subsequent evaluations Friday and Monday the following week.     Friday - Saturday SOC - schedule subsequent discipline evaluations on consecutive days starting Monday of the following week.    For all post-discharge communication and subsequent orders please contact patient's primary care physician. If unable to reach primary care physician or do not receive response within 30 minutes, please contact PCP for clinical staff order clarification    Miscellaneous:   Vestibular Therapy       Home Health Aide:  Nursing Weekly and Physical Therapy Twice weeklyOccupational Therapy Weekly    Wound Care Orders: N/A     I certify that this patient is confined to his home and needs intermittent skilled nursing care, physical therapy, and occupational therapy.

## 2024-06-19 NOTE — PT/OT/SLP PROGRESS
Occupational Therapy   Treatment    Name: Jack Soria  MRN: [id number]  Admitting Diagnosis:  Stroke-like symptoms       Recommendations:     Discharge Recommendations: Low Intensity Therapy  Discharge Equipment Recommendations:  walker, rolling  Barriers to discharge:  Inaccessible home environment, Decreased caregiver support, Other (Comment) (Increased level of assistance)    Assessment:     Jack Soria is a 79 y.o. male with a medical diagnosis of Stroke-like symptoms.   Performance deficits affecting function are weakness, impaired endurance, impaired self care skills, impaired functional mobility, gait instability, impaired balance, decreased safety awareness.    Pt found in bed, agreeable to therapy.  Pt is performing BADL's at Supervision to Mod I level.  He continues with complaints of dizziness and unsteadiness with positional changes especially when turning head possibly due to vertigo. PT notified.  Continue OT services to address functional goals, progressing as able.      Rehab Prognosis:  Good; patient would benefit from acute skilled OT services to address these deficits and reach maximum level of function.       Plan:     Patient to be seen 3 x/week to address the above listed problems via self-care/home management, therapeutic activities, therapeutic exercises  Plan of Care Expires: 07/16/24  Plan of Care Reviewed with: patient    Subjective     Chief Complaint: dizziness   Patient/Family Comments/goals: to go back to work. Pt has a [employer] business per NP.   Pain/Comfort:  Pain Rating 1: 0/10  Pain Rating Post-Intervention 1: 0/10    Objective:     Communicated with: nurse prior to session.  Patient found HOB elevated with bed alarm, peripheral IV, telemetry upon OT entry to room.    General Precautions: Standard, fall    Orthopedic Precautions:N/A  Braces: N/A  Respiratory Status: Room air     Occupational Performance:     Bed Mobility:    Patient completed Rolling/Turning to Left  with  independence  Patient completed Scooting/Bridging with independence  Patient completed Supine to Sit with independence  Patient completed Sit to Supine with independence     Functional Mobility/Transfers:  Patient completed Sit <> Stand Transfer with independence  with  no assistive device   Patient completed Toilet Transfer Stand Pivot technique with independence with  no AD  Functional Mobility: Pt ambulated to and from bathroom with Supervision w/o AD. Unsteady gait pattern, however no LOB.     Activities of Daily Living:  Grooming: independence washes hand at sink  Toileting: independence        [facility] 6 Click ADL: 23    Treatment & Education:  Pt requested to return to bed after toileting.   Pt reports increased congestion in head.  NP and nurse notified.      Patient left HOB elevated with all lines intact, call button in reach, and bed alarm on    GOALS:   Multidisciplinary Problems       Occupational Therapy Goals          Problem: Occupational Therapy    Goal Priority Disciplines Outcome Interventions   Occupational Therapy Goal     OT, PT/OT Progressing    Description: Goals to be met by: 07/16/24     Patient will increase functional independence with ADLs by performing:    UE Dressing with Modified [place].  LE Dressing with Modified [place].  Grooming while standing at sink with Modified [place].  Toileting from toilet with Modified [place] for hygiene and clothing management.   Toilet transfer to toilet with Modified [place].                         Time Tracking:     OT Date of Treatment: 06/19/24  OT Start Time: 1020  OT Stop Time: 1030  OT Total Time (min): 10 min    Billable Minutes:Self Care/Home Management 10               6/19/2024

## 2024-06-19 NOTE — ASSESSMENT & PLAN NOTE
Appreciate neurology's evaluation  Symptoms appear more in the range of vertigo however for complete list will obtain MRI    6/18 pt reports no dizziness after  Epley maneuver with neurology     6.19 pt complains of dizziness, vertigo intermittently overnight and this am   - will add meclizine  - spoke with Neuro recc PT for vertigo maneuvers   - PT performed [place]-Hallpike to R and L; affected side R; performed Epley maneuver to R; incurred nystagmus/c/o dizziness (room spinning during the maneuver; once complete, no c/o dizziness   - recommend standard cane for safety, ENT follow up, OP vestibular rehab (to coincide with ENT recs) to include cervical rehab as patient c/o neck tightness/stiffness-pt also demonstrates decreased ROM in B shoulders R>L.

## 2024-06-20 VITALS
WEIGHT: 177 LBS | TEMPERATURE: 98 F | RESPIRATION RATE: 18 BRPM | OXYGEN SATURATION: 99 % | HEIGHT: 71 IN | DIASTOLIC BLOOD PRESSURE: 57 MMHG | BODY MASS INDEX: 24.78 KG/M2 | SYSTOLIC BLOOD PRESSURE: 117 MMHG | HEART RATE: 97 BPM

## 2024-06-20 LAB
ANION GAP SERPL CALC-SCNC: 9 MMOL/L (ref 8–16)
BUN SERPL-MCNC: 21 MG/DL (ref 8–23)
CALCIUM SERPL-MCNC: 9 MG/DL (ref 8.7–10.5)
CHLORIDE SERPL-SCNC: 104 MMOL/L (ref 95–110)
CO2 SERPL-SCNC: 25 MMOL/L (ref 23–29)
CREAT SERPL-MCNC: 1 MG/DL (ref 0.5–1.4)
ERYTHROCYTE [DISTWIDTH] IN BLOOD BY AUTOMATED COUNT: 14.1 % (ref 11.5–14.5)
EST. GFR  (NO RACE VARIABLE): >60 ML/MIN/1.73 M^2
GLUCOSE SERPL-MCNC: 87 MG/DL (ref 70–110)
HCT VFR BLD AUTO: 38.1 % (ref 40–54)
HGB BLD-MCNC: 12.3 G/DL (ref 14–18)
MAGNESIUM SERPL-MCNC: 1.8 MG/DL (ref 1.6–2.6)
MCH RBC QN AUTO: 26.9 PG (ref 27–31)
MCHC RBC AUTO-ENTMCNC: 32.3 G/DL (ref 32–36)
MCV RBC AUTO: 83 FL (ref 82–98)
PLATELET # BLD AUTO: 232 K/UL (ref 150–450)
PMV BLD AUTO: 10.5 FL (ref 9.2–12.9)
POTASSIUM SERPL-SCNC: 4.3 MMOL/L (ref 3.5–5.1)
RBC # BLD AUTO: 4.57 M/UL (ref 4.6–6.2)
SODIUM SERPL-SCNC: 138 MMOL/L (ref 136–145)
WBC # BLD AUTO: 6.07 K/UL (ref 3.9–12.7)

## 2024-06-20 PROCEDURE — 97530 THERAPEUTIC ACTIVITIES: CPT

## 2024-06-20 PROCEDURE — 25000003 PHARM REV CODE 250: Performed by: STUDENT IN AN ORGANIZED HEALTH CARE EDUCATION/TRAINING PROGRAM

## 2024-06-20 PROCEDURE — 36415 COLL VENOUS BLD VENIPUNCTURE: CPT | Performed by: NURSE PRACTITIONER

## 2024-06-20 PROCEDURE — 25000003 PHARM REV CODE 250: Performed by: NURSE PRACTITIONER

## 2024-06-20 PROCEDURE — 80048 BASIC METABOLIC PNL TOTAL CA: CPT | Performed by: NURSE PRACTITIONER

## 2024-06-20 PROCEDURE — 97116 GAIT TRAINING THERAPY: CPT

## 2024-06-20 PROCEDURE — 83735 ASSAY OF MAGNESIUM: CPT | Performed by: NURSE PRACTITIONER

## 2024-06-20 PROCEDURE — 97535 SELF CARE MNGMENT TRAINING: CPT | Mod: CO

## 2024-06-20 PROCEDURE — 85027 COMPLETE CBC AUTOMATED: CPT | Performed by: NURSE PRACTITIONER

## 2024-06-20 PROCEDURE — 97535 SELF CARE MNGMENT TRAINING: CPT

## 2024-06-20 PROCEDURE — 97110 THERAPEUTIC EXERCISES: CPT

## 2024-06-20 PROCEDURE — 94761 N-INVAS EAR/PLS OXIMETRY MLT: CPT

## 2024-06-20 PROCEDURE — 99900035 HC TECH TIME PER 15 MIN (STAT)

## 2024-06-20 PROCEDURE — 25000003 PHARM REV CODE 250: Performed by: REGISTERED NURSE

## 2024-06-20 RX ORDER — ATORVASTATIN CALCIUM 80 MG/1
80 TABLET, FILM COATED ORAL DAILY
Qty: 60 TABLET | Refills: 0 | Status: SHIPPED | OUTPATIENT
Start: 2024-06-20 | End: 2024-08-19

## 2024-06-20 RX ORDER — LIDOCAINE 50 MG/G
1 PATCH TOPICAL DAILY
Qty: 30 PATCH | Refills: 0 | Status: SHIPPED | OUTPATIENT
Start: 2024-06-20 | End: 2024-07-20

## 2024-06-20 RX ORDER — MECLIZINE HYDROCHLORIDE 25 MG/1
25 TABLET ORAL 3 TIMES DAILY
Status: DISCONTINUED | OUTPATIENT
Start: 2024-06-20 | End: 2024-06-21 | Stop reason: HOSPADM

## 2024-06-20 RX ORDER — NAPROXEN SODIUM 220 MG/1
81 TABLET, FILM COATED ORAL DAILY
COMMUNITY
Start: 2024-06-20 | End: 2025-06-20

## 2024-06-20 RX ORDER — MECLIZINE HYDROCHLORIDE 25 MG/1
25 TABLET ORAL 3 TIMES DAILY
Qty: 30 TABLET | Refills: 0 | Status: SHIPPED | OUTPATIENT
Start: 2024-06-20 | End: 2024-06-30

## 2024-06-20 RX ADMIN — ASPIRIN 81 MG: 81 TABLET, COATED ORAL at 08:06

## 2024-06-20 RX ADMIN — LOSARTAN POTASSIUM 25 MG: 25 TABLET, FILM COATED ORAL at 08:06

## 2024-06-20 RX ADMIN — LIDOCAINE 1 PATCH: 50 PATCH CUTANEOUS at 01:06

## 2024-06-20 RX ADMIN — ATORVASTATIN CALCIUM 80 MG: 40 TABLET, FILM COATED ORAL at 08:06

## 2024-06-20 RX ADMIN — TAMSULOSIN HYDROCHLORIDE 0.4 MG: 0.4 CAPSULE ORAL at 08:06

## 2024-06-20 RX ADMIN — POLYETHYLENE GLYCOL 3350 17 G: 17 POWDER, FOR SOLUTION ORAL at 08:06

## 2024-06-20 RX ADMIN — MECLIZINE HYDROCHLORIDE 25 MG: 25 TABLET ORAL at 02:06

## 2024-06-20 NOTE — PLAN OF CARE
Pt is agreeable with discharge to home today [facility]. All follow up appointments including new PCP and ENT appt made and added for AVS. Pt will be transported home by the hospital transport wheelchair van scheduled for 3:30 PM. Straight cane was delivered at bedside.   06/20/24 1525   Final Note   Assessment Type Final Discharge Note   Anticipated Discharge Disposition Home-Health   What phone number can be called within the next 1-3 days to see how you are doing after discharge? [phone]   Hospital Resources/Appts/Education Provided Appointments scheduled and added to AVS   Post-Acute Status   Post-Acute Authorization Home Health   Home Health Status Set-up Complete/Auth obtained   Coverage [employer]   Discharge Delays None known at this time       [facility] [phone] [address] [facility] [address]      Next Steps: Follow up  Instructions: OFFICE WILL CALL YOU TO SET UP ADMIT VISIT    Nathaniel Trivedi   [phone] [address] [place] [address]     Next Steps: Follow up on 6/26/2024  Instructions: YOUR HAVE A FOLLOW UP APPOINTMENT WITH YOUR NEW PCP NERI MAZARIEGOS,  NURSE PRACTITIONER ON JUNE 26TH AT 10:15 AM   Patient needs to bring in Photo ID, Ins Card, Discharge papers and his actual Medicine bottles. [place] [facility] [place]  Otolaryngology [phone] [address] [place] [address] [address] [place] [id number]     Next Steps: Follow up  Instructions: Appointment is in [facility]. Address is [address] [place] [address] in [place] [address]. [address]. You will see DR DEYSI CAUSEY for your Dizziness and Sinus symptoms      30.8

## 2024-06-20 NOTE — PT/OT/SLP PROGRESS
Physical Therapy Treatment    Patient Name:  Jack Soria   MRN:  [id number]    Recommendations:     Discharge Recommendations: Low Intensity Therapy-recommend ENT follow up, OP vestibular rehab (to coincide with ENT recs) to include cervical rehab as patient c/o neck tightness/stiffness-pt also demonstrates decreased ROM in B shoulders R>L.   Discharge Equipment Recommendations: cane, straight  Barriers to discharge: Decreased caregiver support    Assessment:     Jack Soria is a 79 y.o. male admitted with a medical diagnosis of Stroke-like symptoms.  He presents with the following impairments/functional limitations: weakness, impaired endurance, impaired self care skills, impaired functional mobility, gait instability, decreased safety awareness, impaired balance Patient meeting mobility goals.    Rehab Prognosis: Good; patient would benefit from acute skilled PT services to address these deficits and reach maximum level of function.    Recent Surgery: * No surgery found *      Plan:     During this hospitalization, patient to be seen 4 x/week to address the identified rehab impairments via therapeutic exercises, canalith reposition procedure and progress toward the following goals:    Plan of Care Expires:  07/07/24    Subjective     Chief Complaint: no initial complaints  Patient/Family Comments/goals: to drive and go back to work  Pain/Comfort:  Pain Rating 1: 0/10  Pain Rating Post-Intervention 1: 0/10      Objective:     Communicated with nurse prior to session.  Patient found HOB elevated with bed alarm, peripheral IV, telemetry upon PT entry to room.     General Precautions: Standard, fall  Orthopedic Precautions: N/A  Braces: N/A  Respiratory Status: Room air     Functional Mobility:  Bed Mobility:     Rolling Left:  independence, modified independence, and HOB elevated  Scooting: independence  Supine to Sit: independence, modified independence, and HOB elevated  Sit to Supine: independence,  modified independence, and HOB elevated  Transfers:     Sit to Stand:  independence and modified independence with no AD and straight cane  Toilet Transfer: modified independence with  no AD and use of grab bar   Gait: amb in moreno~250ft using std cane and no LOB (minimal unsteady gait 2/2 pt reports of old R knee sx with damage to ligament)  Stairs:  instructed in up/down one step 4x with one rail and std cane which patient completed Marie- up with the LLE and down with the RLE      AM-PAC 6 CLICK MOBILITY  Turning over in bed (including adjusting bedclothes, sheets and blankets)?: 4  Sitting down on and standing up from a chair with arms (e.g., wheelchair, bedside commode, etc.): 4  Moving from lying on back to sitting on the side of the bed?: 4  Moving to and from a bed to a chair (including a wheelchair)?: 4  Need to walk in hospital room?: 4  Climbing 3-5 steps with a railing?: 4  Basic Mobility Total Score: 24       Treatment & Education:   Patient without c/o dizziness at this time; completed supine roll test with negative results; completed Epley maneuver x 3 with increased nystagmus upon rolling L and relinquished upon sitting; no further c/o dizziness following and continued with amb in moreno~250ft using std cane and no LOB (minimal unsteady gait 2/2 pt reports of old R knee sx with damage to ligament); also instructed in up/down one step 4x with one rail and std cane which patient completed Marie; pt amb in room without AD and able to handle toileting Marie/I; instructed in seated general ex including heel/toe rises, FAQ, GS, foot press x 10; will recommend std cane for use outside the home for safety/added security with uneven surfaces and varying surface types or when; patient able to answer questions correctly regarding how to avoid onset of dizziness.    Patient left HOB elevated with all lines intact, call button in reach, bed alarm on, and nurse notified..    GOALS:   Multidisciplinary Problems        Physical Therapy Goals          Problem: Physical Therapy    Goal Priority Disciplines Outcome Goal Variances Interventions   Physical Therapy Goal     PT, PT/OT Progressing     Description: Goals to be met by: 2024     Patient will increase functional independence with mobility by performin. Supine to sit with Modified [place]-  2. Sit to supine with Modified [place]-met   4. Rolling with Modified [place].-met   5. Sit to stand transfer Mod I using Rolling Walker  Revised: Nik with LRAD-met   6. Bed to chair transfer with Mod I Rolling Walker  Revised: Nik with LRAD  7. Gait  2 x 150 feet Mod I using Rolling Walker.   Revised: Nik with LRAD  8. Ascend/descend 4 stair with bilateral Handrails Stand-by Assistance   Revised: Nik with LRAD-met   9. Lower extremity exercise program  2 x10 reps with supervision   10. Patient to understand how to avoid movements/positions that would trigger the dizziness-met                            Time Tracking:     PT Received On: 24  PT Start Time: 0940     PT Stop Time: 1040  PT Total Time (min): 60 min     Billable Minutes: Gait Training 20 and Therapeutic Exercise 10  Canalith repositioning 15 Self Care 10    Treatment Type: Treatment  PT/PTA: PT     Number of PTA visits since last PT visit: 0     2024

## 2024-06-20 NOTE — NURSING
Discharge orders noted. Additional clinical references attached. Patient's discharge instructions given by bedside RN and reviewed by this VN with patient. Education provided on home care instructions, new and previous medications, diagnosis, when to seek medical attention, and follow-up appointments. New medications delivered by pharmacy. Patient verbalized understanding. All questions answered. Floor nurse notified.

## 2024-06-20 NOTE — PT/OT/SLP PROGRESS
Physical Therapy Treatment    Patient Name:  Jack Soria   MRN:  [id number]    Recommendations:     Discharge Recommendations: Low Intensity Therapy (recommend standard cane for safety, ENT follow up, OP vestibular rehab (to coincide with ENT recs) to include cervical rehab as patient c/o neck tightness/stiffness-pt also demonstrates decreased ROM in B shoulders R>L.)  Discharge Equipment Recommendations: cane, straight  Barriers to discharge: Decreased caregiver support    Assessment:     Jack Soria is a 79 y.o. male admitted with a medical diagnosis of Stroke-like symptoms.  He presents with the following impairments/functional limitations: weakness, impaired endurance, impaired self care skills, impaired functional mobility, gait instability, decreased safety awareness, impaired balance Patient tolerated Epley maneuver with extreme dizziness- none experienced afterward; pt able to amb with and without AD without LOB; educated in movements to avoid and to perform turns and sup<>sit slowly, don't lie flat etc; refer to above for recs.    Rehab Prognosis: Good; patient would benefit from acute skilled PT services to address these deficits and reach maximum level of function.    Recent Surgery: * No surgery found *      Plan:     During this hospitalization, patient to be seen 4 x/week to address the identified rehab impairments via gait training, canalith reposition procedure, therapeutic activities, neuromuscular re-education, therapeutic exercises and progress toward the following goals:    Plan of Care Expires:  07/07/24    Subjective     Chief Complaint: dizziness with certain movements- rolling over in bed, sup to sit  Patient/Family Comments/goals: wants to go back to work (has a [employer] business) and wants to drive  Pain/Comfort:  Pain Rating 1: 0/10  Pain Rating Post-Intervention 1: 0/10      Objective:     Communicated with nurse prior to session.  Patient found HOB elevated with bed alarm,  peripheral IV, telemetry upon PT entry to room.     General Precautions: Standard, fall  Orthopedic Precautions: N/A  Braces: N/A  Respiratory Status: Room air     Functional Mobility:  Bed Mobility:     Rolling Left:  independence and modified independence  Rolling Right: independence and modified independence  Scooting: independence and modified independence  Supine to Sit: independence and modified independence  Sit to Supine: independence and modified independence  Transfers:     Sit to Stand:  independence and modified independence with and without std cane  Toilet Transfer: independence and modified independence with  no AD  using  Step Transfer  Gait: in first session, pt amb in room with supervision with no AD in room; during second session, pt amb in moreno~150ft using SPC with supervision; no LOB throughout    AM-PAC 6 CLICK MOBILITY  Turning over in bed (including adjusting bedclothes, sheets and blankets)?: 4  Sitting down on and standing up from a chair with arms (e.g., wheelchair, bedside commode, etc.): 4  Moving from lying on back to sitting on the side of the bed?: 4  Moving to and from a bed to a chair (including a wheelchair)?: 4  Need to walk in hospital room?: 3  Climbing 3-5 steps with a railing?: 3  Basic Mobility Total Score: 22       Treatment & Education:  During first session, pt experienced onset of dizziness when rolling over and in supine position; performed [place]-Hallpike to R and L; affected side R; performed Epley maneuver to R; incurred nystagmus/c/o dizziness (room spinning during the maneuver; once complete, no c/o dizziness; educated pt on movements/positions to avoid and amb in room with supervision and no AD; pt used the bathroom and returned to bed but bed was flat at the time and before HOB could be elevated, pt laid flat and the dizziness returned but quickly dissipated after the HOB was elevated.  on second session, re-educated patient on movements/positions to avoid and given  handout including self epley maneuver; instructed pt on use of cane and patient amb in moreno~150ft using SPC to use in case dizziness returned at some point; pt did forget and moved his head quickly to the R to answer a nurse but did not incur any dizziness at that time; returned to room     Patient left HOB elevated with all lines intact, call button in reach, bed alarm on, and nurse notified..    GOALS:   Multidisciplinary Problems       Physical Therapy Goals          Problem: Physical Therapy    Goal Priority Disciplines Outcome Goal Variances Interventions   Physical Therapy Goal     PT, PT/OT Progressing     Description: Goals to be met by: 2024     Patient will increase functional independence with mobility by performin. Supine to sit with Modified [place]-  2. Sit to supine with Modified [place]-met   4. Rolling with Modified [place].-met   5. Sit to stand transfer Mod I using Rolling Walker  Revised: Nik with LRAD  6. Bed to chair transfer with Mod I Rolling Walker  Revised: Nik with LRAD  7. Gait  2 x 150 feet Mod I using Rolling Walker.   Revised: Nik with LRAD  8. Ascend/descend 4 stair with bilateral Handrails Stand-by Assistance   Revised: Nik with LRAD  9. Lower extremity exercise program  2 x10 reps with supervision                          Time Tracking:     PT Received On: 24  PT Start Time: 7547   1525  PT Stop Time: 7800   1535  PT Total Time (min): 46 min, 30 min    Billable Minutes: Therapeutic Activity 15  Self Care 16   Canalith repositioning  15 for AM  Gait Training 15 Self Care 15 for PM    Treatment Type: Treatment  PT/PTA: PT     Number of PTA visits since last PT visit: 0     2024

## 2024-06-20 NOTE — PLAN OF CARE
Problem: Physical Therapy  Goal: Physical Therapy Goal  Description: Goals to be met by: 2024     Patient will increase functional independence with mobility by performin. Supine to sit with Modified [place]-  2. Sit to supine with Modified [place]-met   4. Rolling with Modified [place].-met   5. Sit to stand transfer Mod I using Rolling Walker  Revised: Nik with LRAD-met   6. Bed to chair transfer with Mod I Rolling Walker  Revised: Nik with LRAD  7. Gait  2 x 150 feet Mod I using Rolling Walker.   Revised: Nik with LRAD  8. Ascend/descend 4 stair with bilateral Handrails Stand-by Assistance   Revised: Nik with LRAD-met   9. Lower extremity exercise program  2 x10 reps with supervision   10. Patient to understand how to avoid movements/positions that would trigger the dizziness-met 2024 1227 by Jessy Contreras, PT  Outcome: Progressing   Patient without c/o dizziness at this time; completed supine roll test with negative results; completed Epley maneuver with increased nystagmus upon rolling L and relinquished upon sitting; no further c/o dizziness following and continued with amb in moreno using std cane; also instructed in up/down steps with one rail and std cane which patient completed Nik; pt amb in room without AD and able to handle toileting Nik/I; will recommend std cane for use outside the home for safety/added security with uneven surfaces and varying surface types; cont to recommend ENT follow up, OP vestibular rehab (to coincide with ENT recs) to include cervical rehab as able as patient c/o neck tightness/stiffness, also chronic shld issues with ROM.

## 2024-06-20 NOTE — DISCHARGE SUMMARY
"[facility] Medicine  Discharge Summary      Patient Name: Jack Soria  MRN: [id number]  CLEMENTE: [id number]  Patient Class: IP- Inpatient  Admission Date: 6/16/2024  Hospital Length of Stay: 4 days  Discharge Date and Time: 6/20/2024  7:25 PM  Attending Physician: Austin Eugene MD   Discharging Provider: Elvia Gallegos NP  Primary Care Provider: No primary care provider on file.    Primary Care Team: Networked reference to record PCT     HPI:   Jack Soria is a 80yo M with a PMH of HTN, OA, and BPH who presented to the [facility] ED on 6/15/2024 with complaints of dizziness. Per referring NP: "woke two days ago with sinus and ear pressure. Upon noticing symptoms he began "shaking my head a lot" and since then has dizziness which is positional, worse from reclined position to siting or standing. He has several days where" I am getting a quart of mucus out my head a day", cough and worsening dizziness. Dizziness symptoms began approximately 3 days ago, however could be as a 4 out as a week. He has no previous history of CVA."     In the ED, vitals were stable: /71 though otherwise unremarkable. Labs grossly unrevealing. 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 Telestroke consult was placed; pt out of the tPN window. Dr. Estrada recommended transfer for MRI and neurology evaluation.        * No surgery found *      Hospital Course:   6/18 Neurology consulted and following, old CVA results noted, pt to complete stroke workup with TTE/bubble study, add low-dose ASA, follow-up MRI results if no other acute DWI infarcts can continue on ASA; if other infarcts seen will need ASA &Plavix x21 days, increase atorvastatin to 80 mg a day. MRI Brain completed, no acute intracranial abnormality, will continue ASA, [facility] vascular neurology f/up.   - Pt reports " dizziness has resolved, awaiting PT/OT recc.s, no SLP needs  - TTE with normal LVEF 66%, bubble study positive for intracardiac shunt, left atrial septal aneurysm found.  Cardiology consulted, no indication at this time for PFO closure continue ASA, statin and BP management may follow-up with Cardiology as an outpatient.     6/19 Pt complains dizzines, vertigo, feeling as if the room was spinning intemittently, nursing reports dizziness overnight. Will add meclizine, PT notified of complaints, performed [place]-Hallpike to R and L; affected side R; performed Epley maneuver to R; incurred nystagmus/c/o dizziness (room spinning during the maneuver; once complete, no c/o dizziness. ENT consult ordered. Will observe overnight, plan for d/c in am.     6/20 Pt repeated epley maneuver, meclizine change to scheduled dosing. Outpatient ENT follow-up scheduled. Pt referral to [facility] for vertigo treatment, he is to follow-up with neurovascular as well, has appointment with PCP scheduled for next week.     Patient is stable for discharge, medications follow-ups reviewed.  Order placed for straight cane per PT recommendations.       Goals of Care Treatment Preferences:  Code Status: Full Code      Consults:   Consults (From admission, onward)          Status Ordering Provider     Inpatient consult to Social Work  Once        Provider:  (Not yet assigned)    Completed JES HUFFMAN     Inpatient consult to Cardiology-[facility]  Once        Provider:  (Not yet assigned)    Completed ANETA JAQUEZ     Inpatient consult to [facility]  Once        Provider:  (Not yet assigned)    Completed RASHAD MARTINES     Inpatient consult to Registered Dietitian/Nutritionist  Once        Provider:  (Not yet assigned)    Completed RASHAD MARTINES     IP consult to case management/social work  Once        Provider:  (Not yet assigned)    Completed RASHAD MARTINES     Inpatient consult to  Registered Dietitian/Nutritionist  Once        Provider:  (Not yet assigned)    Completed INNOCENT-FREDDIE ORTIZ.            Neuro  * Stroke-like symptoms  Patient presented with dizziness outside of window for thrombolytic  CT head with old infarcts which were unknown to patient  CTA head and neck without LVO or stenosis    MRI brain still pending.   echo ordered   consult neurology  Start aspirin and high-intensity statin    6/18 MRI brain -No acute intracranial abnormality will continue ASA, atorvastatin 80mg, follow-up with vascular Neurology, awaiting final PT/OT recs  - Echo with atrial septal aneurysm, cardiology consulted 6/17 - no indication at this time for PFO closure, continue ASA, statin and BP management follow-up with cardiology outpatient            Cardiac/Vascular  Interatrial septal aneurysm with PFO        Renal/  Benign prostatic hyperplasia with urinary hesitancy  Resume Flomax      Other  Vertigo  Appreciate neurology's evaluation  Symptoms appear more in the range of vertigo however for complete list will obtain MRI    6/18 pt reports no dizziness after  Epley maneuver with neurology     6.19 pt complains of dizziness, vertigo intermittently overnight and this am   - will add meclizine  - spoke with Neuro recc PT for vertigo maneuvers   - PT performed [place]-Hallpike to R and L; affected side R; performed Epley maneuver to R; incurred nystagmus/c/o dizziness (room spinning during the maneuver; once complete, no c/o dizziness   - recommend standard cane for safety, ENT follow up, OP vestibular rehab (to coincide with ENT recs) to include cervical rehab as patient c/o neck tightness/stiffness-pt also demonstrates decreased ROM in B shoulders R>L.  6/20 patient continues to complain of intermittent dizziness, meclizine changed to scheduled dosing, patient has PCP appointment scheduled for 6/26, has been referred to [facility] for continued PT therapy, ENT follow-up scheduled  "      Final Active Diagnoses:    Diagnosis Date Noted POA    PRINCIPAL PROBLEM:  Stroke-like symptoms [R29.90] 06/16/2024 Yes    Vertigo [R42] 06/17/2024 Yes    Interatrial septal aneurysm with PFO [Q21.12, I25.3] 06/17/2024 Not Applicable    Cerebrovascular accident (CVA) [I63.9] 06/17/2024 Yes    Benign prostatic hyperplasia with urinary hesitancy [N40.1, R39.11] 11/21/2022 Yes      Problems Resolved During this Admission:       Discharged Condition: good    Disposition: Home-Health Care Svc    Follow Up:   Follow-up Information       [facility] up.    Specialty: Home Health Services  Why: OFFICE WILL CALL YOU TO SET UP ADMIT VISIT  Contact information:  [address] [facility] [address]  [phone]               [facility] Follow up on 6/26/2024.    Why: YOUR HAVE A FOLLOW UP APPOINTMENT WITH YOUR NEW PCP NERI MAZARIEGOS,  NURSE PRACTITIONER ON JUNE 26TH AT 10:15 AM      Patient needs to bring in Photo ID, Ins Card, Discharge papers and his actual Medicine bottles.  [facility]  Contact information:  [address] [facility] [address]  [phone]             [facility] - Follow up.    Specialty: Otolaryngology  Why: Appointment is in [facility]. Address is [address] [place] [address] in [place] [address]. [address]. You will see DR DEYSI CAUSEY for your Dizziness and Sinus symptoms  Contact information:  [address] [place] [address]   [address]  [facility] [address]  [phone]                           Patient Instructions:      WALKER FOR HOME USE     Order Specific Question Answer Comments   Type of Walker: Adult (5'4"-6'6")    With wheels? Yes    Height: 5' 11" (1.803 m)    Weight: 80.3 kg (177 lb 0.5 oz)    Length of need (1-99 months): 99    Does patient have medical equipment at home? none    Please check all that apply: Patient is unable to safely ambulate without equipment.      CANE FOR HOME USE     Order Specific Question Answer " "Comments   Type of Cane: Straight    Height: 5' 11" (1.803 m)    Weight: 80.3 kg (177 lb 0.5 oz)    Does patient have medical equipment at home? none    Length of need (1-99 months): 99    Please check all that apply: Patient is unable to safely ambulate without equipment.      Ambulatory referral/consult to Physical/Occupational Therapy   Standing Status: Future   Referral Priority: Routine Referral Type: Physical Medicine   Referral Reason: Specialty Services Required   Number of Visits Requested: 1     Ambulatory referral/consult to ENT   Standing Status: Future   Referral Priority: Routine Referral Type: Consultation   Referral Reason: Specialty Services Required   Requested Specialty: Otolaryngology   Number of Visits Requested: 1     Ambulatory referral/consult to Vascular Neurology   Standing Status: Future   Referral Priority: Routine Referral Type: Consultation   Referral Reason: Specialty Services Required   Requested Specialty: Vascular Neurology   Number of Visits Requested: 1     Diet Cardiac   Order Comments: See Stroke Patient Education Guide Booklet for details.     Call 911 for any of the following:   Order Comments: Call 911  right away if any of the following warning signs come on suddenly, even if the symptoms only last for a few minutes. With stroke, timing is very important.   - Warning Signs of Stroke:  - Weakness: You may feel a sudden weakness, tingling or loss of feeling on one side of your face or body.  - Vision Problems: You may have sudden double vision or trouble seeing in one or both eyes.  - Speech Problems: You may have sudden trouble talking, slured speech, or problems understanding others.  - Headache: You may have sudden, severe headache.  - Movement Problems: You may experience dizziness, a feeling of spinning, a loss of balance, a feeling of falling or blackouts.     Activity as tolerated       Significant Diagnostic Studies: Labs: CMP   No results for input(s): "NA", "K", " ""CL", "CO2", "GLU", "BUN", "CREATININE", "CALCIUM", "PROT", "ALBUMIN", "BILITOT", "ALKPHOS", "AST", "ALT", "ANIONGAP", "ESTGFRAFRICA", "EGFRNONAA" in the last 48 hours.  , CBC   No results for input(s): "WBC", "HGB", "HCT", "PLT" in the last 48 hours.  , Lipid Panel   Lab Results   Component Value Date    CHOL 146 06/15/2024    HDL 50 06/15/2024    LDLCALC 84.8 06/15/2024    TRIG 56 06/15/2024    CHOLHDL 34.2 06/15/2024   , and A1C:   Recent Labs   Lab 06/19/24  0322   HGBA1C 5.6     Radiology: MRI: MRI BRAIN WITHOUT CONTRAST   Impression:       No acute intracranial abnormality    Remote cortical and subcortical infarcts, atrophy, and periventricular white matter changes as described.    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  Cervical carotid and vertebral circulation--no measurable stenosis.  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  No large vessel occlusion remaining proximal intracranial arterial circulation.  Mild multifocal stenosis anterior cerebral artery and posterior cerebral artery branches.  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    Cardiac Graphics: ECG: Sinus Octavio      Echocardiogram: Transthoracic echo (TTE) complete (Cupid Only):   Results for orders placed or performed during the hospital encounter of 06/16/24   Echo   Result Value Ref Range    BSA 2.01 m2    Franklin's Biplane MOD Ejection Fraction 66 %    LVOT stroke volume 67.55 cm3    LVIDd 4.68 3.5 - 6.0 cm    LV Systolic Volume 36.85 mL    LV Systolic " "Volume Index 18.4 mL/m2    LVIDs 3.06 2.1 - 4.0 cm    LV Diastolic Volume 101.21 mL    LV Diastolic Volume Index 50.61 mL/m2    IVS 1.05 0.6 - 1.1 cm    LVOT diameter 2.27 cm    LVOT area 4.0 cm2    FS 35 28 - 44 %    Left Ventricle Relative Wall Thickness 0.50 cm    Posterior Wall 1.16 (A) 0.6 - 1.1 cm    LV mass 187.45 g    LV Mass Index 94 g/m2    MV Peak E Fred 0.35 m/s    TDI LATERAL 0.04 m/s    TDI SEPTAL 0.05 m/s    E/E' ratio 7.78 m/s    MV Peak A Fred 0.57 m/s    TR Max Fred 2.37 m/s    E/A ratio 0.61     E wave deceleration time 327.84 msec    MV "A" wave duration [id number] msec    LV SEPTAL E/E' RATIO 7.00 m/s    LV LATERAL E/E' RATIO 8.75 m/s    PV Peak S Fred 0.58 m/s    PV Peak D Fred 0.37 m/s    Pulm vein S/D ratio 1.57     LVOT peak fred 0.93 m/s    Left Ventricular Outflow Tract Mean Velocity 0.64 cm/s    Left Ventricular Outflow Tract Mean Gradient 1.87 mmHg    RV S' 14.64 cm/s    TAPSE 1.80 cm    LA size 3.87 cm    Left Atrium Minor Axis 5.19 cm    Left Atrium Major Axis 5.39 cm    LA volume (mod) 40.67 cm3    LA Volume Index (Mod) 20.3 mL/m2    RA Major Axis 4.94 cm    RA Width 4.89 cm    AV mean gradient 5 mmHg    AV peak gradient 8 mmHg    Ao peak fred 1.38 m/s    Ao VTI 24.10 cm    LVOT peak VTI 16.70 cm    AV valve area 2.80 cm²    AV Velocity Ratio 0.67     AV index (prosthetic) 0.69     LANA by Velocity Ratio 2.73 cm²    MV mean gradient 0 mmHg    MV peak gradient 1 mmHg    MV stenosis pressure 1/2 time 95.07 ms    MV valve area p 1/2 method 2.31 cm2    MV valve area by continuity eq 5.63 cm2    MV VTI 12.0 cm    Triscuspid Valve Regurgitation Peak Gradient 22 mmHg    PV PEAK VELOCITY 0.79 m/s    PV peak gradient 2 mmHg    Pulmonary Valve Mean Velocity 0.67 m/s    Sinus 4.02 cm    STJ 3.42 cm    Ascending aorta 3.37 cm    IVC diameter 0.87 cm    Mean e' 0.05 m/s    ZLVIDS -1.24     ZLVIDD -2.20     LA Volume Index 30.4 mL/m2    LA volume 60.88 cm3    LA WIDTH 3.5 cm    TV resting " pulmonary artery pressure 25 mmHg    RV TB RVSP 5 mmHg    Est. RA pres 3 mmHg    Narrative      Left Ventricle: The left ventricle is normal in size. There is   concentric remodeling. There is normal systolic function. Biplane (2D)   method of discs ejection fraction is 66%. There is normal diastolic   function.    Right Ventricle: Normal right ventricular cavity size. Systolic   function is normal. TAPSE is 1.80 cm.    Left Atrium: There is an atrial septal aneurysm.    Tricuspid Valve: There is mild regurgitation.    Pulmonic Valve: There is mild regurgitation.    Pulmonary Artery: The estimated pulmonary artery systolic pressure is   25 mmHg.    IVC/SVC: Normal venous pressure at 3 mmHg.    Pericardium: There is a small effusion. No indication of cardiac   tamponade.         Pending Diagnostic Studies:       None           Medications:  Reconciled Home Medications:      Medication List        START taking these medications      aspirin 81 MG Chew  Take 1 tablet (81 mg total) by mouth once daily.     atorvastatin 80 MG tablet  Commonly known as: LIPITOR  Take 1 tablet (80 mg total) by mouth once daily.     LIDOcaine 5 %  Commonly known as: LIDODERM  Place 1 patch onto the skin once daily. Remove & Discard patch within 12 hours or as directed by MD.  Apply to Right  neck     meclizine 25 mg tablet  Commonly known as: ANTIVERT  Take 1 tablet (25 mg total) by mouth 3 (three) times daily. for 10 days            CONTINUE taking these medications      losartan 25 MG tablet  Commonly known as: COZAAR  Take 1 tablet (25 mg total) by mouth once daily.     meloxicam 7.5 MG tablet  Commonly known as: MOBIC  Take 7.5 mg by mouth once daily.     tamsulosin 0.4 mg Cap  Commonly known as: FLOMAX  TAKE 1 CAPSULE BY MOUTH DAILY AT BEDTIME              Indwelling Lines/Drains at time of discharge:   Lines/Drains/Airways       None                   Time spent on the discharge of patient: 45   minutes         Elvia Gallegos  NP  Department of [facility] Medicine  [facility]

## 2024-06-20 NOTE — PT/OT/SLP PROGRESS
Occupational Therapy   Treatment    Name: Jack Soria  MRN: [id number]  Admitting Diagnosis:  Stroke-like symptoms       Recommendations:     Discharge Recommendations: Low Intensity Therapyrecommend ENT follow up, OP vestibular rehab (to coincide with ENT recs) to include cervical rehab as patient c/o neck tightness/stiffness-pt also demonstrates decreased ROM in B shoulders R>L.  Discharge Equipment Recommendations:  cane, straight  Barriers to discharge:  Decreased caregiver support     Assessment:     Jack Soria is a 79 y.o. male with a medical diagnosis of Stroke-like symptoms.  Performance deficits affecting function are weakness, impaired endurance, impaired functional mobility, gait instability, impaired balance, impaired self care skills.    Pt has met all OT goals. Defer to PT for vestibular rehab.     Rehab Prognosis:  Good; patient would benefit from acute skilled OT services to address these deficits and reach maximum level of function.       Plan:     Patient to be seen 3 x/week to address the above listed problems via self-care/home management, therapeutic activities, therapeutic exercises  Plan of Care Expires: 07/16/24  Plan of Care Reviewed with: patient    Subjective     Chief Complaint: dizziness when turning head to right   Patient/Family Comments/goals: to go back to work   Pain/Comfort:  Pain Rating 1: 0/10  Pain Rating Post-Intervention 1: 0/10    Objective:     Communicated with: nurse prior to session.  Patient found HOB elevated with bed alarm, peripheral IV, telemetry upon OT entry to room.    General Precautions: Standard, fall    Orthopedic Precautions:N/A  Braces: N/A  Respiratory Status: Room air     Occupational Performance:     Bed Mobility:    Patient completed Rolling/Turning to Left with  modified independence w/ BR  Patient completed Scooting/Bridging with independence  Patient completed Supine to Sit with modified independence, HOB elevated   Patient completed Sit  to Supine with modified independence, HOB elevated     Functional Mobility/Transfers:  Patient completed Sit <> Stand Transfer with independence  with  no assistive device   Patient completed Toilet Transfer Stand Pivot technique with modified independence with  grab bars Pt reports pulling on door handle when standing from toilet at home.   Functional Mobility: Pt ambulated to and from bathroom at Mod I level w/o AD, unsteady gait, no LOB. Rec use of SC for safety.     Activities of Daily Living:  Lower Body Dressing: modified independence Rec saddle sitting on EOB or purchasing a reacher/sock aid to assist with LB drsg independence in order to avoid bending over to reach feet potentially causing dizziness. Pt unable to place LLE in Figure 4 position 2/2 tight hips.  Also instructed to sit when threading LEs through pants and standing for pull up/down only to prevent fall.       [facility] 6 Click ADL: 23    Treatment & Education:  Reeducated on slow mvmts and avoiding turning head during mobility.   Rec use of SC for safety.  Educated on safe position for LB drsg and use of reacher.     Patient left HOB elevated with all lines intact, call button in reach, and bed alarm on    GOALS:   Multidisciplinary Problems       Occupational Therapy Goals          Problem: Occupational Therapy    Goal Priority Disciplines Outcome Interventions   Occupational Therapy Goal     OT, PT/OT Progressing    Description: Goals to be met by: 07/16/24     Patient will increase functional independence with ADLs by performing:    UE Dressing with Modified [place]. MET  LE Dressing with Modified [place]. MET  Grooming while standing at sink with Modified [place]. MET  Toileting from toilet with Modified [place] for hygiene and clothing management. MET  Toilet transfer to toilet with Modified [place]. MET                         Time Tracking:     OT Date of Treatment: 06/20/24  OT Start Time: 1115  OT Stop Time:  1129  OT Total Time (min): 14 min    Billable Minutes:Self Care/Home Management 14               6/20/2024

## 2024-06-20 NOTE — PLAN OF CARE
VN Note: Review of plan of care, notes, labs, orders, care plan review, available as needed.   Problem: Adult Inpatient Plan of Care  Goal: Plan of Care Review  Outcome: Progressing

## 2024-06-20 NOTE — PLAN OF CARE
Rounded at bedside. Discharge pending ENT evaluation. Pt continue to c/o intermittent dizziness. Ambulated in hallway with PT and use of straight cane. CM ordered cane for home use. Will also dc with Home Health services.   06/20/24 1051   Rounds   Attendance Provider;Nurse    Discharge Plan A Home;Home Health   Why the patient remains in the hospital Requires continued medical care   Transition of Care Barriers None

## 2024-06-20 NOTE — PLAN OF CARE
Problem: Occupational Therapy  Goal: Occupational Therapy Goal  Description: Goals to be met by: 07/16/24     Patient will increase functional independence with ADLs by performing:    UE Dressing with Modified [place].  LE Dressing with Modified [place].  Grooming while standing at sink with Modified [place].  Toileting from toilet with Modified [place] for hygiene and clothing management.   Toilet transfer to toilet with Modified [place].    Outcome: Met Jack Soria is a 79 y.o. male with a medical diagnosis of Stroke-like symptoms.  Performance deficits affecting function are weakness, impaired endurance, impaired functional mobility, gait instability, impaired balance, impaired self care skills.    Pt has met all OT goals. Defer to PT for vestibular rehab.

## 2024-06-20 NOTE — PT/OT/SLP PROGRESS
Occupational Therapy  D/c OT     Patient Name:  Jack Soria   MRN:  [id number]    Patient has met OT goals. D/c OT     6/20/2024

## 2024-06-23 NOTE — ASSESSMENT & PLAN NOTE
Appreciate neurology's evaluation  Symptoms appear more in the range of vertigo however for complete list will obtain MRI    6/18 pt reports no dizziness after  Epley maneuver with neurology     6.19 pt complains of dizziness, vertigo intermittently overnight and this am   - will add meclizine  - spoke with Neuro recc PT for vertigo maneuvers   - PT performed [place] to R and L; affected side R; performed Epley maneuver to R; incurred nystagmus/c/o dizziness (room spinning during the maneuver; once complete, no c/o dizziness   - recommend standard cane for safety, ENT follow up, OP vestibular rehab (to coincide with ENT recs) to include cervical rehab as patient c/o neck tightness/stiffness-pt also demonstrates decreased ROM in B shoulders R>L.  6/20 patient continues to complain of intermittent dizziness, meclizine changed to scheduled dosing, patient has PCP appointment scheduled for 6/26, has been referred to [facility] for continued PT therapy, ENT follow-up scheduled

## 2024-06-27 ENCOUNTER — LAB VISIT (OUTPATIENT)
Dept: LAB | Facility: HOSPITAL | Age: 79
End: 2024-06-27
Attending: NURSE PRACTITIONER
Payer: MEDICARE

## 2024-06-27 DIAGNOSIS — Z86.39 H/O DIABETES MELLITUS: ICD-10-CM

## 2024-06-27 DIAGNOSIS — I63.9 CEREBROVASCULAR ACCIDENT (CVA), UNSPECIFIED MECHANISM: ICD-10-CM

## 2024-06-27 LAB
CHOLEST SERPL-MCNC: 81 MG/DL (ref 120–199)
CHOLEST/HDLC SERPL: 1.8 {RATIO} (ref 2–5)
ESTIMATED AVG GLUCOSE: 111 MG/DL (ref 68–131)
FOLATE SERPL-MCNC: 32.4 NG/ML (ref 4–24)
HBA1C MFR BLD: 5.5 % (ref 4–5.6)
HDLC SERPL-MCNC: 44 MG/DL (ref 40–75)
HDLC SERPL: 54.3 % (ref 20–50)
LDLC SERPL CALC-MCNC: 29.6 MG/DL (ref 63–159)
NONHDLC SERPL-MCNC: 37 MG/DL
T4 FREE SERPL-MCNC: 0.99 NG/DL (ref 0.71–1.51)
TRIGL SERPL-MCNC: 37 MG/DL (ref 30–150)
TSH SERPL DL<=0.005 MIU/L-ACNC: 4.06 UIU/ML (ref 0.4–4)
VIT B12 SERPL-MCNC: 690 PG/ML (ref 210–950)

## 2024-06-27 PROCEDURE — 84439 ASSAY OF FREE THYROXINE: CPT | Performed by: NURSE PRACTITIONER

## 2024-06-27 PROCEDURE — 82746 ASSAY OF FOLIC ACID SERUM: CPT | Performed by: NURSE PRACTITIONER

## 2024-06-27 PROCEDURE — 80061 LIPID PANEL: CPT | Performed by: NURSE PRACTITIONER

## 2024-06-27 PROCEDURE — 83036 HEMOGLOBIN GLYCOSYLATED A1C: CPT | Performed by: NURSE PRACTITIONER

## 2024-06-27 PROCEDURE — 82607 VITAMIN B-12: CPT | Performed by: NURSE PRACTITIONER

## 2024-06-27 PROCEDURE — 36415 COLL VENOUS BLD VENIPUNCTURE: CPT | Performed by: NURSE PRACTITIONER

## 2024-06-27 PROCEDURE — 84443 ASSAY THYROID STIM HORMONE: CPT | Performed by: NURSE PRACTITIONER

## 2024-08-16 ENCOUNTER — HOSPITAL ENCOUNTER (OUTPATIENT)
Dept: RADIOLOGY | Facility: HOSPITAL | Age: 79
Discharge: HOME | End: 2024-08-16
Attending: NURSE PRACTITIONER
Payer: MEDICARE

## 2024-08-16 DIAGNOSIS — M25.50 ARTHRALGIA, UNSPECIFIED JOINT: Primary | ICD-10-CM

## 2024-08-16 DIAGNOSIS — M25.50 ARTHRALGIA, UNSPECIFIED JOINT: ICD-10-CM

## 2024-08-16 PROCEDURE — 73030 X-RAY EXAM OF SHOULDER: CPT | Mod: TC,RT

## 2024-09-16 PROBLEM — I63.9 CEREBROVASCULAR ACCIDENT (CVA): Status: RESOLVED | Noted: 2024-06-17 | Resolved: 2024-09-16

## 2024-10-01 PROBLEM — Z86.73 STROKE, RECENT, WITHOUT LATE EFFECT: Status: ACTIVE | Noted: 2024-10-01

## 2024-10-30 PROBLEM — Z87.74 S/P PATENT FORAMEN OVALE CLOSURE: Chronic | Status: ACTIVE | Noted: 2024-10-30

## 2024-10-30 PROBLEM — T14.8XXA BLEEDING FROM WOUND: Status: ACTIVE | Noted: 2024-10-30

## 2024-11-15 ENCOUNTER — EXTERNAL HOME HEALTH (OUTPATIENT)
Dept: HOME HEALTH SERVICES | Facility: HOSPITAL | Age: 79
End: 2024-11-15
Payer: MEDICARE